# Patient Record
Sex: FEMALE | Race: WHITE | Employment: PART TIME | URBAN - METROPOLITAN AREA
[De-identification: names, ages, dates, MRNs, and addresses within clinical notes are randomized per-mention and may not be internally consistent; named-entity substitution may affect disease eponyms.]

---

## 2021-10-21 ENCOUNTER — HOSPITAL ENCOUNTER (INPATIENT)
Age: 26
LOS: 5 days | Discharge: HOME | DRG: 753 | End: 2021-10-26
Attending: EMERGENCY MEDICINE | Admitting: PSYCHIATRY & NEUROLOGY
Payer: COMMERCIAL

## 2021-10-21 DIAGNOSIS — F23 ACUTE PSYCHOSIS (HCC): ICD-10-CM

## 2021-10-21 DIAGNOSIS — F31.2 SEVERE MANIC BIPOLAR 1 DISORDER WITH PSYCHOTIC BEHAVIOR (HCC): Primary | ICD-10-CM

## 2021-10-21 LAB
ACETAMINOPHEN LEVEL: <5 MCG/ML (ref 10–30)
ALBUMIN SERPL-MCNC: 4.3 G/DL (ref 3.5–5.2)
ALP BLD-CCNC: 76 U/L (ref 35–104)
ALT SERPL-CCNC: 12 U/L (ref 0–32)
AMPHETAMINE SCREEN, URINE: NOT DETECTED
ANION GAP SERPL CALCULATED.3IONS-SCNC: 11 MMOL/L (ref 7–16)
AST SERPL-CCNC: 17 U/L (ref 0–31)
BARBITURATE SCREEN URINE: NOT DETECTED
BASOPHILS ABSOLUTE: 0.05 E9/L (ref 0–0.2)
BASOPHILS RELATIVE PERCENT: 0.4 % (ref 0–2)
BENZODIAZEPINE SCREEN, URINE: NOT DETECTED
BILIRUB SERPL-MCNC: 0.3 MG/DL (ref 0–1.2)
BUN BLDV-MCNC: 6 MG/DL (ref 6–20)
CALCIUM SERPL-MCNC: 9.9 MG/DL (ref 8.6–10.2)
CANNABINOID SCREEN URINE: POSITIVE
CHLORIDE BLD-SCNC: 103 MMOL/L (ref 98–107)
CO2: 25 MMOL/L (ref 22–29)
COCAINE METABOLITE SCREEN URINE: POSITIVE
CREAT SERPL-MCNC: 0.9 MG/DL (ref 0.5–1)
EOSINOPHILS ABSOLUTE: 0.03 E9/L (ref 0.05–0.5)
EOSINOPHILS RELATIVE PERCENT: 0.2 % (ref 0–6)
ETHANOL: <10 MG/DL (ref 0–0.08)
FENTANYL SCREEN, URINE: NOT DETECTED
GFR AFRICAN AMERICAN: >60
GFR NON-AFRICAN AMERICAN: >60 ML/MIN/1.73
GLUCOSE BLD-MCNC: 115 MG/DL (ref 74–99)
HCG, URINE, POC: NEGATIVE
HCT VFR BLD CALC: 41.9 % (ref 34–48)
HEMOGLOBIN: 13.8 G/DL (ref 11.5–15.5)
IMMATURE GRANULOCYTES #: 0.08 E9/L
IMMATURE GRANULOCYTES %: 0.6 % (ref 0–5)
INFLUENZA A: NOT DETECTED
INFLUENZA B: NOT DETECTED
LYMPHOCYTES ABSOLUTE: 1.71 E9/L (ref 1.5–4)
LYMPHOCYTES RELATIVE PERCENT: 12.7 % (ref 20–42)
Lab: ABNORMAL
Lab: NORMAL
MCH RBC QN AUTO: 28.7 PG (ref 26–35)
MCHC RBC AUTO-ENTMCNC: 32.9 % (ref 32–34.5)
MCV RBC AUTO: 87.1 FL (ref 80–99.9)
METHADONE SCREEN, URINE: NOT DETECTED
MONOCYTES ABSOLUTE: 0.92 E9/L (ref 0.1–0.95)
MONOCYTES RELATIVE PERCENT: 6.8 % (ref 2–12)
NEGATIVE QC PASS/FAIL: NORMAL
NEUTROPHILS ABSOLUTE: 10.68 E9/L (ref 1.8–7.3)
NEUTROPHILS RELATIVE PERCENT: 79.3 % (ref 43–80)
OPIATE SCREEN URINE: NOT DETECTED
OXYCODONE URINE: NOT DETECTED
PDW BLD-RTO: 13.4 FL (ref 11.5–15)
PHENCYCLIDINE SCREEN URINE: NOT DETECTED
PLATELET # BLD: 228 E9/L (ref 130–450)
PMV BLD AUTO: 11.4 FL (ref 7–12)
POSITIVE QC PASS/FAIL: NORMAL
POTASSIUM SERPL-SCNC: 4.2 MMOL/L (ref 3.5–5)
RBC # BLD: 4.81 E12/L (ref 3.5–5.5)
SALICYLATE, SERUM: 0.9 MG/DL (ref 0–30)
SARS-COV-2 RNA, RT PCR: NOT DETECTED
SODIUM BLD-SCNC: 139 MMOL/L (ref 132–146)
TOTAL PROTEIN: 7.2 G/DL (ref 6.4–8.3)
TRICYCLIC ANTIDEPRESSANTS SCREEN SERUM: NEGATIVE NG/ML
WBC # BLD: 13.5 E9/L (ref 4.5–11.5)

## 2021-10-21 PROCEDURE — 99285 EMERGENCY DEPT VISIT HI MDM: CPT

## 2021-10-21 PROCEDURE — 82077 ASSAY SPEC XCP UR&BREATH IA: CPT

## 2021-10-21 PROCEDURE — 93005 ELECTROCARDIOGRAM TRACING: CPT | Performed by: EMERGENCY MEDICINE

## 2021-10-21 PROCEDURE — 80053 COMPREHEN METABOLIC PANEL: CPT

## 2021-10-21 PROCEDURE — 85025 COMPLETE CBC W/AUTO DIFF WBC: CPT

## 2021-10-21 PROCEDURE — 80143 DRUG ASSAY ACETAMINOPHEN: CPT

## 2021-10-21 PROCEDURE — 1240000000 HC EMOTIONAL WELLNESS R&B

## 2021-10-21 PROCEDURE — 80307 DRUG TEST PRSMV CHEM ANLYZR: CPT

## 2021-10-21 PROCEDURE — 80179 DRUG ASSAY SALICYLATE: CPT

## 2021-10-21 PROCEDURE — 87636 SARSCOV2 & INF A&B AMP PRB: CPT

## 2021-10-21 RX ORDER — FLUPHENAZINE HYDROCHLORIDE 2.5 MG/ML
5 INJECTION, SOLUTION INTRAMUSCULAR EVERY 6 HOURS PRN
Status: DISCONTINUED | OUTPATIENT
Start: 2021-10-21 | End: 2021-10-26 | Stop reason: HOSPADM

## 2021-10-21 RX ORDER — HALOPERIDOL 5 MG
5 TABLET ORAL EVERY 6 HOURS PRN
Status: DISCONTINUED | OUTPATIENT
Start: 2021-10-21 | End: 2021-10-21

## 2021-10-21 RX ORDER — TRAZODONE HYDROCHLORIDE 50 MG/1
50 TABLET ORAL NIGHTLY PRN
Status: DISCONTINUED | OUTPATIENT
Start: 2021-10-21 | End: 2021-10-26 | Stop reason: HOSPADM

## 2021-10-21 RX ORDER — LORAZEPAM 2 MG/ML
2 INJECTION INTRAMUSCULAR EVERY 6 HOURS PRN
Status: DISCONTINUED | OUTPATIENT
Start: 2021-10-21 | End: 2021-10-26 | Stop reason: HOSPADM

## 2021-10-21 RX ORDER — MAGNESIUM HYDROXIDE/ALUMINUM HYDROXICE/SIMETHICONE 120; 1200; 1200 MG/30ML; MG/30ML; MG/30ML
30 SUSPENSION ORAL PRN
Status: DISCONTINUED | OUTPATIENT
Start: 2021-10-21 | End: 2021-10-26 | Stop reason: HOSPADM

## 2021-10-21 RX ORDER — HYDROXYZINE PAMOATE 50 MG/1
50 CAPSULE ORAL 3 TIMES DAILY PRN
Status: DISCONTINUED | OUTPATIENT
Start: 2021-10-21 | End: 2021-10-26 | Stop reason: HOSPADM

## 2021-10-21 RX ORDER — ACETAMINOPHEN 325 MG/1
650 TABLET ORAL EVERY 6 HOURS PRN
Status: DISCONTINUED | OUTPATIENT
Start: 2021-10-21 | End: 2021-10-26 | Stop reason: HOSPADM

## 2021-10-21 RX ORDER — DIPHENHYDRAMINE HCL 25 MG
50 TABLET ORAL EVERY 6 HOURS PRN
Status: DISCONTINUED | OUTPATIENT
Start: 2021-10-21 | End: 2021-10-26 | Stop reason: HOSPADM

## 2021-10-21 RX ORDER — DIPHENHYDRAMINE HYDROCHLORIDE 50 MG/ML
50 INJECTION INTRAMUSCULAR; INTRAVENOUS EVERY 6 HOURS PRN
Status: DISCONTINUED | OUTPATIENT
Start: 2021-10-21 | End: 2021-10-26 | Stop reason: HOSPADM

## 2021-10-21 RX ORDER — NICOTINE 21 MG/24HR
1 PATCH, TRANSDERMAL 24 HOURS TRANSDERMAL DAILY
Status: DISCONTINUED | OUTPATIENT
Start: 2021-10-21 | End: 2021-10-22

## 2021-10-21 RX ORDER — LORAZEPAM 1 MG/1
2 TABLET ORAL EVERY 6 HOURS PRN
Status: DISCONTINUED | OUTPATIENT
Start: 2021-10-21 | End: 2021-10-26 | Stop reason: HOSPADM

## 2021-10-21 RX ORDER — ZIPRASIDONE MESYLATE 20 MG/ML
20 INJECTION, POWDER, LYOPHILIZED, FOR SOLUTION INTRAMUSCULAR ONCE
Status: DISCONTINUED | OUTPATIENT
Start: 2021-10-21 | End: 2021-10-26 | Stop reason: HOSPADM

## 2021-10-21 RX ORDER — HALOPERIDOL 5 MG/ML
5 INJECTION INTRAMUSCULAR EVERY 6 HOURS PRN
Status: DISCONTINUED | OUTPATIENT
Start: 2021-10-21 | End: 2021-10-21

## 2021-10-21 ASSESSMENT — SLEEP AND FATIGUE QUESTIONNAIRES
DIFFICULTY FALLING ASLEEP: YES
SLEEP PATTERN: INSOMNIA
AVERAGE NUMBER OF SLEEP HOURS: 45
RESTFUL SLEEP: NO
DIFFICULTY ARISING: NO
DO YOU HAVE DIFFICULTY SLEEPING: YES
DO YOU USE A SLEEP AID: COMMENT
DIFFICULTY STAYING ASLEEP: YES

## 2021-10-21 ASSESSMENT — PATIENT HEALTH QUESTIONNAIRE - PHQ9: SUM OF ALL RESPONSES TO PHQ QUESTIONS 1-9: 13

## 2021-10-21 ASSESSMENT — PAIN DESCRIPTION - INTENSITY: RATING_2: 9

## 2021-10-21 ASSESSMENT — PAIN DESCRIPTION - PROGRESSION
CLINICAL_PROGRESSION_2: NOT CHANGED
CLINICAL_PROGRESSION: NOT CHANGED

## 2021-10-21 ASSESSMENT — PAIN DESCRIPTION - LOCATION
LOCATION_2: HEAD
LOCATION: BACK

## 2021-10-21 ASSESSMENT — PAIN DESCRIPTION - PAIN TYPE: TYPE: ACUTE PAIN

## 2021-10-21 ASSESSMENT — PAIN DESCRIPTION - FREQUENCY: FREQUENCY: CONTINUOUS

## 2021-10-21 ASSESSMENT — PAIN DESCRIPTION - DESCRIPTORS
DESCRIPTORS: BURNING
DESCRIPTORS_2: DISCOMFORT

## 2021-10-21 ASSESSMENT — PAIN SCALES - GENERAL
PAINLEVEL_OUTOF10: 10
PAINLEVEL_OUTOF10: 0

## 2021-10-21 ASSESSMENT — LIFESTYLE VARIABLES: HISTORY_ALCOHOL_USE: YES

## 2021-10-21 ASSESSMENT — PAIN DESCRIPTION - DURATION: DURATION_2: CONTINUOUS

## 2021-10-21 ASSESSMENT — PAIN DESCRIPTION - ORIENTATION
ORIENTATION: MID
ORIENTATION_2: LEFT

## 2021-10-21 NOTE — ED PROVIDER NOTES
Department of Emergency Medicine   ED  Provider Note  Admit Date/RoomTime: 10/21/2021  8:57 AM  ED Room: [address] [place] [address]          History of Present Illness:  10/21/21, Time: 9:19 AM EDT  Chief Complaint   Patient presents with    Psychiatric Evaluation     found in impound lot busting out car windows pt was naked pt reported that she is from [address] [place] [address] Road and is schizophrenic pt is not pinkslipped pt denies si/denies hi admits to Hallucinations reports compliant with meds tx with [facility] in [address] is a 27 y.o. female presenting to the ED for bizarre behavior with history of schizophrenia, beginning after being found this morning. Patient is from [facility] with a history of schizophrenia. Patient was found to be in a car lot punching windows, completely naked. Patient was then brought to ED for evaluation. Patient is requesting that we call her  to take her home. She then states that she was driving from [facility] [employer] to [employer] to get her boyfriend out of [employer]. She appears to be a poor historian with flight of ideas and appearing to be acutely psychotic. Patient denies any drug use. Review of Systems:   Pertinent positives and negatives are stated within HPI, all other systems reviewed and are negative.        --------------------------------------------- PAST HISTORY ---------------------------------------------  Past Medical History:  has a past medical history of Schizophrenia ([facility] [address] Past Surgical History:  has no past surgical history on file. Social History:  reports that she has never smoked. She has never used smokeless tobacco. She reports current alcohol use. Family History: family history is not on file. . Unless otherwise noted, family history is non contributory    The patients home medications have been reviewed.     Allergies: Imitrex [sumatriptan]        ---------------------------------------------------PHYSICAL EXAM--------------------------------------    Constitutional/General: Alert and oriented x3  Head: Normocephalic and atraumatic  Eyes: PERRL, EOMI, sclera non icteric  Mouth: Oropharynx clear, handling secretions, no trismus, no asymmetry of the posterior oropharynx or uvular edema  Neck: Supple, full ROM, no stridor, no meningeal signs  Respiratory: Lungs clear to auscultation bilaterally, no wheezes, rales, or rhonchi. Not in respiratory distress  Cardiovascular:  Regular rate. Regular rhythm. No murmurs, no aortic murmurs, no gallops, or rubs. 2+ distal pulses. Equal extremity pulses. Chest: No chest wall tenderness  GI:  Abdomen Soft, Non tender, Non distended. No rebound, guarding, or rigidity. No pulsatile masses. Musculoskeletal: Moves all extremities x 4. Warm and well perfused, no clubbing, cyanosis, or edema. Capillary refill <3 seconds  Integument: skin warm and dry. No rashes. Neurologic: GCS 15, no focal deficits, symmetric strength 5/5 in the upper and lower extremities bilaterally  Psychiatric: Flight of ideas with tangential thought          -------------------------------------------------- RESULTS -------------------------------------------------  I have personally reviewed all laboratory and imaging results for this patient. Results are listed below.      LABS: (Lab results interpreted by me)  Results for orders placed or performed during the hospital encounter of 10/21/21   COVID-19 & Influenza Combo    Specimen: Nasopharyngeal Swab   Result Value Ref Range    SARS-CoV-2 RNA, RT PCR NOT DETECTED NOT DETECTED    INFLUENZA A NOT DETECTED NOT DETECTED    INFLUENZA B NOT DETECTED NOT DETECTED   Comprehensive Metabolic Panel   Result Value Ref Range    Sodium 139 132 - 146 mmol/L    Potassium 4.2 3.5 - 5.0 mmol/L    Chloride 103 98 - 107 mmol/L    CO2 25 22 - 29 mmol/L    Anion Gap 11 7 - 16 mmol/L    Glucose 115 (H) 74 - 99 mg/dL    BUN 6 6 - 20 mg/dL    CREATININE 0.9 0.5 - 1.0 mg/dL    GFR Non-African American >60 >=60 mL/min/1.73    GFR African American >60     Calcium 9.9 8.6 - 10.2 mg/dL    Total Protein 7.2 6.4 - 8.3 g/dL    Albumin 4.3 3.5 - 5.2 g/dL    Total Bilirubin 0.3 0.0 - 1.2 mg/dL    Alkaline Phosphatase 76 35 - 104 U/L    ALT 12 0 - 32 U/L    AST 17 0 - 31 U/L   CBC Auto Differential   Result Value Ref Range    WBC 13.5 (H) 4.5 - 11.5 E9/L    RBC 4.81 3.50 - 5.50 E12/L    Hemoglobin 13.8 11.5 - 15.5 g/dL    Hematocrit 41.9 34.0 - 48.0 %    MCV 87.1 80.0 - 99.9 fL    MCH 28.7 26.0 - 35.0 pg    MCHC 32.9 32.0 - 34.5 %    RDW 13.4 11.5 - 15.0 fL    Platelets 383 493 - 039 E9/L    MPV 11.4 7.0 - 12.0 fL    Neutrophils % 79.3 43.0 - 80.0 %    Immature Granulocytes % 0.6 0.0 - 5.0 %    Lymphocytes % 12.7 (L) 20.0 - 42.0 %    Monocytes % 6.8 2.0 - 12.0 %    Eosinophils % 0.2 0.0 - 6.0 %    Basophils % 0.4 0.0 - 2.0 %    Neutrophils Absolute 10.68 (H) 1.80 - 7.30 E9/L    Immature Granulocytes # 0.08 E9/L    Lymphocytes Absolute 1.71 1.50 - 4.00 E9/L    Monocytes Absolute 0.92 0.10 - 0.95 E9/L    Eosinophils Absolute 0.03 (L) 0.05 - 0.50 E9/L    Basophils Absolute 0.05 0.00 - 0.20 E9/L   Serum Drug Screen   Result Value Ref Range    Ethanol Lvl <10 mg/dL    Acetaminophen Level <5.0 (L) 10.0 - 47.2 mcg/mL    Salicylate, Serum 0.9 0.0 - 30.0 mg/dL    TCA Scrn NEGATIVE Cutoff:300 ng/mL   Urine Drug Screen   Result Value Ref Range    Amphetamine Screen, Urine NOT DETECTED Negative <1000 ng/mL    Barbiturate Screen, Ur NOT DETECTED Negative < 200 ng/mL    Benzodiazepine Screen, Urine NOT DETECTED Negative < 200 ng/mL    Cannabinoid Scrn, Ur POSITIVE (A) Negative < 50ng/mL    Cocaine Metabolite Screen, Urine POSITIVE (A) Negative < 300 ng/mL    Opiate Scrn, Ur NOT DETECTED Negative < 300ng/mL    PCP Screen, Urine NOT DETECTED Negative < 25 ng/mL    Methadone Screen, Urine NOT DETECTED Negative <300 ng/mL    Oxycodone Urine NOT DETECTED Negative <100 ng/mL    FENTANYL SCREEN, URINE NOT DETECTED Negative <1 ng/mL    Drug Screen Comment: see below    POC Pregnancy Urine Qual   Result Value Ref Range    HCG, Urine, POC Negative Negative    Lot Number [id number]     Positive QC Pass/Fail Pass     Negative QC Pass/Fail Pass    EKG 12 Lead   Result Value Ref Range    Ventricular Rate 64 BPM    Atrial Rate 64 BPM    P-R Interval 138 ms    QRS Duration 74 ms    Q-T Interval 394 ms    QTc Calculation (Bazett) 406 ms    P Axis 46 degrees    R Axis 24 degrees    T Axis 26 degrees   ,       RADIOLOGY:  Interpreted by Radiologist unless otherwise specified  No orders to display         EKG Interpretation  Interpreted by emergency department physician, Dr. Janie Rowley    Date of EKG: 10/21/21  Time: 1147    Rhythm: normal sinus   Rate: 64  Axis: normal  Conduction: normal  ST Segments: no acute change  T Waves: no acute change    Clinical Impression: No findings suggestive of acute ischemia or injury  Comparison to prior EKG: no previous EKG      ------------------------- NURSING NOTES AND VITALS REVIEWED ---------------------------   The nursing notes within the ED encounter and vital signs as below have been reviewed by myself  BP (!) 130/93   Pulse 106   Temp 98.8 °F (37.1 °C) (Oral)   Resp 16   Ht 5' 8\" (1.727 m)   Wt 228 lb (103.4 kg)   LMP  (LMP Unknown)   SpO2 98%   BMI 34.67 kg/m²     Oxygen Saturation Interpretation: Normal    The patients available past medical records and past encounters were reviewed. ------------------------------ ED COURSE/MEDICAL DECISION MAKING----------------------  Medications   ziprasidone (GEODON) injection 20 mg (0 mg IntraMUSCular Held 10/21/21 1211)   sterile water injection 1.2 mL (0 mLs Injection Held 10/21/21 1213)                 Medical Decision Making:     I, Dr. Alec Ngo, am the primary provider of record    23-year-old female with history of schizophrenia presenting for bizarre behavior.   Patient was found at a car lot attempting to break windows while completely naked.  She arrives somewhat agitated with tangential thoughts and flight of ideas. Patient became more agitated, was given Geodon. Urine drug screen is positive for cannabinoids and cocaine, serum drug screen is negative. Metabolic panel is within acceptable limits, no leukocytosis or anemia. EKG shows no signs of ischemia or injury. She is medically clear for mental health evaluation. Cal[place] slip was initiated. Re-Evaluations: This patient's ED course included: a personal history and physicial examination    This patient has remained hemodynamically stable during their ED course. Counseling: The emergency provider has spoken with the patient and discussed todays results, in addition to providing specific details for the plan of care and counseling regarding the diagnosis and prognosis. Questions are answered at this time and they are agreeable with the plan.       --------------------------------- IMPRESSION AND DISPOSITION ---------------------------------    IMPRESSION  1. Acute psychosis ([facility] [address]        DISPOSITION  Disposition: Admit to mental health unit - medically cleared for admission  Patient condition is stable        NOTE: This report was transcribed using voice recognition software.  Every effort was made to ensure accuracy; however, inadvertent computerized transcription errors may be present        Oscar Jones DO  10/21/21 1508

## 2021-10-21 NOTE — ED NOTES
Attempted to call Dr Sherwood/ Alexandra Borja NP for admission informed this RN will call back d/t currently with another patient.      Britta Sharma RN  10/21/21 9018

## 2021-10-21 NOTE — ED NOTES
Registration requested insurance information. Pt's wallet in belongings has several items not in her name. [employer] was contacted.      Linda Crespo  10/21/21 0107

## 2021-10-21 NOTE — ED NOTES
Pt not cooperative needs constant redirection refusing lab work requesting to leave AMA. This RN went and spoke with dr Gaby Conley him of pt's concerns. awaiting evaluation.      Nereyda Gibbs RN  10/21/21 0289

## 2021-10-21 NOTE — ED NOTES
Pt reported to this RN that she takes Abilify, Trazodone, Latuda, Hydroxyzine and CBD oil.      Holley Pallas, RN  10/21/21 6425

## 2021-10-21 NOTE — ED NOTES
Emergency Department [facility] AN AFFILIATE OF [facility] Biopsychosocial Assessment Note    Chief Complaint: found in [address] out car windows pt was naked pt reported that she is from [address] and is schizophrenic pt is not pinkslipped pt denies si/denies hi admits to Hallucinations reports compliant with meds tx with [facility] in [address] [facility] Summary/History:   Pt was brought in by EMS, who reported that she was found naked smashing windows in the impound. Pt is evidently unstable - talking to herself and unseen others, saying \"the voices in my head are not real\", yelling at staff, uncooperative, and disruptive. She explained that she came to [facility] to see her boyfriend in senior care, but needs her  from [facility] [employer] to come and pick her up. Her thoughts are scattered and confused, her speech is slurred, her behavior is hyperactive - she is a poor historian at this time. Pt denies SI, no HI - she appears to be internally stimulated, overly anxious with poor insight and judgement and manic. Pt stated \"i'm crazy as hell\". Gender  [] Male [x] Female [] Transgender  [] Other    Sexual Orientation    [x] Heterosexual [] Homosexual [] Bisexual [] Other    Suicidal Behavioral: CSSR-S Complete. [] Reports:    [] Past [] Present   [x] Denies    Homicidal/ Violent Behavior  [] Reports:   [] Past [] Present   [x] Denies     Hallucinations/Delusions   [x] Reports:   [] Denies     Substance Use/Alcohol Use/Addiction: SBIRT Screen Complete.    [] Reports:   [x] Denies     Trauma History  [] Reports:  [] Denies     Collateral Information:   No emergency contacts on file and pt is not willing to give any information    Level of Care/Disposition Plan  [] Home:   [] Outpatient Provider:   [] Crisis Unit:   [x] Inpatient Psychiatric Unit:  [] Other:        Navi Hendricks, [facility]  10/21/21 [address] [facility] Bard Walker, [facility]  10/21/21 1004

## 2021-10-21 NOTE — ED NOTES
Dr Griselda Colder on site pt cont to be uncooperative/ cont to voice from [facility] [employer] states has a boyfriend here in residential that she was going to  denies smashing any windows then in another instant states she has schizophrenia and doesn't remeber what she has done.   Pt informed of plan of care and continues to refuse blood work or further testing     Nereyda Gibbs RN  10/21/21 8146

## 2021-10-21 NOTE — ED NOTES
Bed: BH-26  Expected date: 10/21/21  Expected time:   Means of arrival: [facility]  Comments:  [place]  10/21/21 7221

## 2021-10-21 NOTE — ED NOTES
Patient has been accepted for admission to [facility] by Oscar Hung NP under Dr. Feliberto Orellana. Patient will go to room 7521B. Called admitting and notified Ela Mast. CAMRYN RN is aware to call nurse to nurse and put in for patient transport.      LAKESHA Prieto, [place]  10/21/21 1923

## 2021-10-22 PROBLEM — F31.2 SEVERE MANIC BIPOLAR 1 DISORDER WITH PSYCHOTIC BEHAVIOR (HCC): Status: ACTIVE | Noted: 2021-10-22

## 2021-10-22 PROBLEM — F19.10 POLYSUBSTANCE ABUSE (HCC): Status: ACTIVE | Noted: 2021-10-22

## 2021-10-22 LAB
CHOLESTEROL, TOTAL: 181 MG/DL (ref 0–199)
EKG ATRIAL RATE: 64 BPM
EKG P AXIS: 46 DEGREES
EKG P-R INTERVAL: 138 MS
EKG Q-T INTERVAL: 394 MS
EKG QRS DURATION: 74 MS
EKG QTC CALCULATION (BAZETT): 406 MS
EKG R AXIS: 24 DEGREES
EKG T AXIS: 26 DEGREES
EKG VENTRICULAR RATE: 64 BPM
HBA1C MFR BLD: 5.4 % (ref 4–5.6)
HDLC SERPL-MCNC: 37 MG/DL
LDL CHOLESTEROL CALCULATED: 116 MG/DL (ref 0–99)
TOTAL CK: 137 U/L (ref 20–180)
TRIGL SERPL-MCNC: 141 MG/DL (ref 0–149)
VLDLC SERPL CALC-MCNC: 28 MG/DL

## 2021-10-22 PROCEDURE — 6370000000 HC RX 637 (ALT 250 FOR IP): Performed by: PSYCHIATRY & NEUROLOGY

## 2021-10-22 PROCEDURE — 83036 HEMOGLOBIN GLYCOSYLATED A1C: CPT

## 2021-10-22 PROCEDURE — 80061 LIPID PANEL: CPT

## 2021-10-22 PROCEDURE — 82550 ASSAY OF CK (CPK): CPT

## 2021-10-22 PROCEDURE — 99222 1ST HOSP IP/OBS MODERATE 55: CPT | Performed by: NURSE PRACTITIONER

## 2021-10-22 PROCEDURE — 6370000000 HC RX 637 (ALT 250 FOR IP): Performed by: NURSE PRACTITIONER

## 2021-10-22 PROCEDURE — 93010 ELECTROCARDIOGRAM REPORT: CPT | Performed by: INTERNAL MEDICINE

## 2021-10-22 PROCEDURE — 36415 COLL VENOUS BLD VENIPUNCTURE: CPT

## 2021-10-22 PROCEDURE — 1240000000 HC EMOTIONAL WELLNESS R&B

## 2021-10-22 RX ORDER — OLANZAPINE 10 MG/1
10 TABLET ORAL NIGHTLY
Status: DISCONTINUED | OUTPATIENT
Start: 2021-10-22 | End: 2021-10-23

## 2021-10-22 RX ORDER — DIVALPROEX SODIUM 500 MG/1
500 TABLET, DELAYED RELEASE ORAL EVERY 12 HOURS SCHEDULED
Status: DISCONTINUED | OUTPATIENT
Start: 2021-10-22 | End: 2021-10-26 | Stop reason: HOSPADM

## 2021-10-22 RX ADMIN — NICOTINE POLACRILEX 4 MG: 2 GUM, CHEWING BUCCAL at 19:40

## 2021-10-22 RX ADMIN — DIVALPROEX SODIUM 500 MG: 250 TABLET, DELAYED RELEASE ORAL at 21:10

## 2021-10-22 RX ADMIN — TRAZODONE HYDROCHLORIDE 50 MG: 50 TABLET ORAL at 21:10

## 2021-10-22 ASSESSMENT — PAIN - FUNCTIONAL ASSESSMENT
PAIN_FUNCTIONAL_ASSESSMENT: 0-10
PAIN_FUNCTIONAL_ASSESSMENT: 0-10

## 2021-10-22 ASSESSMENT — SLEEP AND FATIGUE QUESTIONNAIRES
DIFFICULTY ARISING: NO
DIFFICULTY FALLING ASLEEP: YES
DIFFICULTY STAYING ASLEEP: YES
RESTFUL SLEEP: YES
DO YOU HAVE DIFFICULTY SLEEPING: YES
SLEEP PATTERN: DIFFICULTY FALLING ASLEEP;DISTURBED/INTERRUPTED SLEEP
DO YOU USE A SLEEP AID: YES

## 2021-10-22 ASSESSMENT — LIFESTYLE VARIABLES: HISTORY_ALCOHOL_USE: YES

## 2021-10-22 ASSESSMENT — PAIN SCALES - GENERAL
PAINLEVEL_OUTOF10: 0
PAINLEVEL_OUTOF10: 0

## 2021-10-22 NOTE — BH NOTE
[facility]  Initial Interdisciplinary Treatment Plan NOTE    Review Date & Time: 10-22-21  930 am    Patient was not in treatment team    Admission Type:   Admission Type: Involuntary    Reason for admission:  Reason for Admission: \"I was destroying shit like crazy, becausei think i am God \" \"was going to  friend from residential and got lost on the side of the road\" lost my grandma 2 months ago and she is the reason I keep loosing my mind \"      Estimated Length of Stay Update:  2-4 days  Estimated Discharge Date Update: 2-4 days    EDUCATION:   Learner Progress Toward Treatment Goals: Reviewed results and recommendations of this team and Reviewed group plan and strategies    Method: Small group    Outcome: Verbalized understanding    PATIENT GOALS:  \"To go to group\" pr pt. PLAN/TREATMENT RECOMMENDATIONS UPDATE: Begin medication regimen and assess pt responses. GOALS UPDATE:   Time frame for Short-Term Goals: Daily re assessment .     Kurt Colon RN

## 2021-10-22 NOTE — PROGRESS NOTES
(x )  Recognizing danger situations (included triggers and roadblocks)                    (x )  Coping skills (new ways to manage stress, exercise, relaxation techniques, changing routine, distraction)                                                           (x )  Basic information about quitting (benefits of quitting, techniques in how to quit, available resources  ( x) Referral for counseling faxed to [facility]                                           ( ) Patient refused counseling  ( ) Patient has not smoked in the last 30 days    Metabolic Screening:    No results found for: LABA1C    No results found for: CHOL  No results found for: TRIG  No results found for: HDL  No components found for: LDLCAL  No results found for: LABVLDL      Body mass index is 34.67 kg/m². BP Readings from Last 2 Encounters:   10/21/21 124/72           Pt admitted with followings belongings:  Dentures: None  Vision - Corrective Lenses: None  Hearing Aid: None  Jewelry: None  Body Piercings Removed: N/A  Clothing: Shirt  Other Valuables: Other (Comment) (book bag)     Patient's home medications were not brought,  Patient oriented to surroundings and program expectations and copy of patient rights given.  Received admission packet:and PIN   Consents reviewed, signed except voluntary  Patient verbalize understanding: of involuntary form and rights    Patient education on precautions: every 15 min observations for safety                    Dennis Rico RN

## 2021-10-22 NOTE — BH NOTE
Pt is stable and without distress presently. Py can become anxious though not out of control. Pt denies suicidal or homicidal ideations. Pt denies hallucinations. Will follow and monitor.

## 2021-10-22 NOTE — CARE COORDINATION
Biopsychosocial Assessment Note    Social work met with patient to complete the biopsychosocial assessment and CSSR-S. Mental Status Exam: pt alert&oriented x4. Pt cooperative. Mood anxious, labile, congruent affect. Eye contact fair, speech rapid. Pt thoughts preoccupied, tangential. Insight/judgement poor. Pt denies SI/HI/AVH. Chief Complaint: \"found in [address] out car windows pt was naked pt reported that she is from [address] and is schizophrenic pt is not pinkslipped pt denies si/denies hi admits to Hallucinations reports compliant with meds tx with [facility] in [place]\"    Patient Report: pt reports she came to [facility] to  her friend from [facility], she was told he wasn't being released because he had to go to a half way house, and then became mad, went to the car lot and began breaking windows naked. Pt states she has a hx of getting naked when she does drugs, reports using cocaine \"recreationally\" and CBD/THC. Pt reports past alcohol abuse, reports she quit one month ago. Pt has a psych admission hx, last admission at [facility] 10/12/21 for three days. Pt reports she was admitted at this time for being naked again. Pt denies any hx of SI, attempts, or self injurious behaviors. Pt denies trauma hx.      Gender  [] Male [x] Female [] Transgender  [] Other    Sexual Orientation    [x] Heterosexual [] Homosexual [] Bisexual [] Other    Suicidal Ideation  [] Past [] Present [x] Denies     Homicidal Ideation  [] Past [] Present [x] Denies     Hallucinations/Delusions (Specify type)  [] Reports [x] Denies     Substance Use/Alcohol Use/Addiction  [x] Reports [] Denies     Tobacco Use (within the last 6 months)  [x] Reports [] Denies     Trauma History  [] Reports [x] Denies     Collateral Contact (JANEEN signed)  Name: Velvet Razo  Relationship:   Number: [phone]    Collateral Information: Spoke with Lesli Zazueta who confirmed pt will be returning home, he will be coming here to get her, and there are no weapons in the home. He stated [place] car [place]. Angi Edmundo reports pt drove here to get someone from snf and then everything happened that led to her admission. He confirmed pt was just in the hospital last week for the same thing however he feels pt is getting worse. He is concerned with pt getting stable on the right meds and with pt getting into a position that she cannot get out of or get herself hurt.           Access to Weapons per Collateral Contact: [] Reports [x] Denies       Follow up provider preference: [address] [facility] for discharge  Location (where do they plan on discharging to?): home to  in [address] (who will pick them up at discharge?) TBD, has caresource    Medications (will they have money for copays at discharge?): [employer]

## 2021-10-22 NOTE — PLAN OF CARE
Pt denies suicidal or homicidal ideations. Pt denies hallucinations. Pt is without immediate distress presently. Pt  reports goal, \"to go to group\" pr pt. Will follow and monitor.

## 2021-10-22 NOTE — H&P
Department of Psychiatry  History and Physical - Adult       Patient personally seen and examined by me mental status examination performed. I agree the below assessment by the medical student. Psychomotor evaluation revealed no agitation retardation any abnormal movements. Her eye contact is fair her speech is rapid and pressured. Her mood is \"I am fine. \"  Affect is anxious. Thought process with flight of ideas. Thought contents devoid of auditory visualizations delusions or other perceptual normalities. She denies suicidal homicidal ideations intent or plan her impulse control is poor her cognitive function was to be at her baseline her insight judgment is poor she is alert oriented time place and person          CHIEF COMPLAINT:  Wandering naked on the impound lot and punching car windows    Patient was seen after discussing with the treatment team and reviewing the chart    CIRCUMSTANCES OF ADMISSION: Involuntarily admitted from ED after exhibiting bizarre behavior and requiring psychiatric evaluation. HISTORY OF PRESENT ILLNESS:      The patient is a 27 y.o.,  female, currently unemployed and with significant past psychiatric history of schizophrenia, anxiety, bipolar disorder , PTSD and history of prescription and street drug abuse presenting to the ED yesterday for bizarre behavior. Patient was last hospitalized at [facility] for acute psychosis. Per ED she was brought in after found naked in an impound lot punching at the windows. Urine toxicology was positive for cannabis and cocaine. EKG was unremarkable. In the ED she admitted to hallucinations and appeared acutely psychotic with flights of ideas and tangentiality to the ED physician. Geodon was administered after she became more agitated. Patient was transferred to 7S after being medically cleared and pink-slipped by ED physician. I saw the patient today and she appears sleepy and irritable.  She was uncooperative and a poor historian, likely because she wanted to sleep. Upon examination, she does not appear to be internally stimulated or acutely psychotic currently. Per the patient, she came from [facility] to  a friend from [place], but was turned away and got lost causing her to become distressed. She sates that she doesn't remember what happened after but remembers someone in the impound lot called 911 and she was brought in by ambulance. She denied cocaine use but admitted to it after being told her urine toxicology was positive. She became angered that the question was asked when it was already known. Her previous hospitalization was a similar presentation in which she was brought in by EMS after being found naked and wandering around the neighborhood, with a positive urine cocaine and cannanbis screen. Collateral contact during that encounter stated she had been \"manic\" recently and patient stated she had had a panic attack triggered by anxiety. Today, she denies any suicidal or homicidal ideations, and denies any auditory or visual hallucinations. She admits to a history of occassional auditory hallucinations, consistent with her schizophrenic history but denies any history of suicidal ideations or attempts. She denies any paranoid ideations and any delusions. Her history does not She endorses difficulty sleeping occasionally but denies depressive symptoms. She describes herself as being very impulsive in general. She denies manic symptoms. She denies feelings of abandonment but endorses feeling empty sometimes. She states that she has been tried on multiple medications in the past including Abilify, Trazodone, Latuda, Hydroxyzine but states that none of them have worked for her in the past, and hence she has discontinued her medications in the last month. She endorses allergies to Abilify and Imitrex and anaphylactic reaction to Haloperidol. She states that trazodone causes her to act bizarrely.  She reports using CBD oil which she claims helps with her symptoms. Patient's believes her mother has either bipolar disorder or schizophrenia. She denies any current or previous legal issues. She denies any history of physical, sexual or emotional abuse and denies any previous head trauma or loss of consciousness. Past Psychiatric History:  Patient admits to previous diagnostic history of anxiety, schizophrenia, PTSD, and bipolar disorder. She reports 3 previous inpatient hospitalizations, 2 in the past year, most recently 10/12/2021 at [facility]. Per patient during her last encounter, she reported being hospitalized in 2015 for a month at [facility], released for a week and re-hospitalized for another month. She has been tried on multiple medications including Zyprexa, Abilify (which she is allergic to), Latuda, and Hydroxyzine. She says none have worked for her except Ativan. She reports anaphylactic reaction to Haldol. Family Psychiatric History:  Patient but thinks mother was diagnosed with either schizophrenia or bipolar disorder in mother. No family history of suicidal ideations or attempt. Substance Abuse History:  Patient admits to cocaine and marijuana use . She denies EtOH use although admitted to it in ED and in previous encounters. She denies history of cigarette smoking and denies any other drug use. Past Legal History:  Patient denies any previous or current legal issues. She has never been jailed or imprisoned. Personal, Family and social History:  Patient was born and raised in [place], She is  with no children and unemployed. She lives with  and 2 friends. Both parents are alive and patient has 4 siblings. She says her family is somewhat supportive. She says she is not looking for a job currently. She completed high school and a semester of college. She wants to go back to college but is not financially able to.     She denies any history of physical, sexual or emotional abuse in childhood. She denies ever being in a motor vehicle accident, or ever suffering head trauma or loss of consciousness. She denies presence of guns or other weapons in the home. Past Medical History:        Diagnosis Date    Migraine     Schizophrenia ([facility] [address]        Medications Prior to Admission:   No medications prior to admission. Past Surgical History:    History reviewed. No pertinent surgical history. Allergies:   Imitrex [sumatriptan], Abilify [aripiprazole], and Haldol [haloperidol]  Anaphylactic reaction to haldol. Family History  History reviewed. No pertinent family history. EXAMINATION:    REVIEW OF SYSTEMS:    ROS:  [x] All negative/unchanged except if checked. Explain positive(checked items) below:  [] Constitutional  [] Eyes  [] Ear/Nose/Mouth/Throat  [] Respiratory  [] CV  [] GI  []   [] Musculoskeletal  [] Skin/Breast  [] Neurological  [] Endocrine  [] Heme/Lymph  [] Allergic/Immunologic    Explanation:     Vitals:  /78   Pulse 83   Temp 97.5 °F (36.4 °C) (Temporal)   Resp 15   Ht 5' 8\" (1.727 m)   Wt 228 lb (103.4 kg)   LMP  (LMP Unknown)   SpO2 95%   BMI 34.67 kg/m²      Physical Examination:   Head: x  Atraumatic: x normocephalic  Skin and Mucosa        Moist x  Dry   Pale  x Normal        Cranial Nerves Examination:   CN II:   xPupils are reactive to light  Pupils are non reactive to light  CN III, IV, VI:  xNo eye deviation    No diplopia or ptosis   CN V:    xFacial Sensation is intact     Facial Sensation is not intact   CN IIIV:   x Hearing is normal to rubbing fingers   CN IX, X:     xNormal gag reflex and phonation   CN XI:   xShoulder shrug and neck rotation is normal  CNXII:    xTongue is midline no deviation or atrophy    Mental Status Examination:    Mental status exam revealed a 27year old female, appears stated age, in hospital gown. She appears fairly groomed and in fair hygiene. Patient was uncooperative and evasive in revealing information. Psychomotor revealed no agitation. Speech was normal rate and rhythm. Eye contact was very poor as patient kept falling asleep. Mood \"I am happy\", affect was incongruent with mood as she was irritable likely due to sleepiness. Thought process is logical, with no flights of ideas or looseness of associations. Thought content today is devoid of any auditory or visual hallucinations. She did not appear internally stimulated today and did not appear to have any paranoid ideations or any other preoccupations. She denies any suicidal or homicidal ideations. Cognitive function is at baseline, she is able to do serial 7s. Recall is 3/3, memory appears intact. She is groggy but oriented to time, place and person. Insight and judgement are poor. Impulse control is poor. DIAGNOSIS:   Severe Bipolar 1 disorder with psychotic features  Polysubstance abuse          LABS: REVIEWED TODAY:  Recent Labs     10/21/21  1114   WBC 13.5*   HGB 13.8        Recent Labs     10/21/21  1114      K 4.2      CO2 25   BUN 6   CREATININE 0.9   GLUCOSE 115*     Recent Labs     10/21/21  1114   BILITOT 0.3   ALKPHOS 76   AST 17   ALT 12     Lab Results   Component Value Date    LABAMPH NOT DETECTED 10/21/2021    BARBSCNU NOT DETECTED 10/21/2021    LABBENZ NOT DETECTED 10/21/2021    LABMETH NOT DETECTED 10/21/2021    OPIATESCREENURINE NOT DETECTED 10/21/2021    PHENCYCLIDINESCREENURINE NOT DETECTED 10/21/2021    ETOH <10 10/21/2021     No results found for: TSH, FREET4  No results found for: LITHIUM  No results found for: VALPROATE, CBMZ  No results found for: LITHIUM, VALPROATE      Radiology No results found. TREATMENT PLAN:    Risk Management: Based on the diagnosis and assessment biopsychosocial treatment model was presented to the patient and was given the opportunity to ask any question.   The patient was agreeable to the plan and all the patient's questions were answered to the patient's satisfaction. I discussed with the patient the risk, benefit, alternative and common side effects for the proposed medication treatment. The patient is consenting to this treatment. Collateral Information:  Will obtain collateral information from the family or friends. Will obtain medical records as appropriate from out patient providers  Will consult the hospitalist for a physical exam to rule out any co-morbid physical condition. Home medication Reconciled     Patient's diagnosis, treatment plan, medication management was formulated at the end of evaluation and after reviewing relevant documentation. Patient was seen directly by myself and Dr. Lizy Valencia Medications started during this admission :    Depakote 500 mg oral BID  Zyprexa 10 mg oral at bedtime  Nicotine patch once daily. Prn Haldol 5mg and Vistaril 50mg q6hr for extreme agitation. Trazodone as ordered for insomnia  Vistaril as ordered for anxiety      Psychotherapy:   Encourage participation in milieu and group therapy  Individual therapy as needed              Behavioral Services  Medicare Certification Upon Admission    I certify that this patient's inpatient psychiatric hospital admission is medically necessary for:    [x] (1) Treatment which could reasonably be expected to improve this patient's condition,       [] (2) Or for diagnostic study;     AND     [x](2) The inpatient psychiatric services are provided while the individual is under the care of a physician and are included in the individualized plan of care.     Estimated length of stay/service 3 to 7 days based on stability    Plan for post-hospital care outpatient psychiatric and counseling services    Electronically signed by CELSO Rodriguez CNP on 07/23/5732 at 12:40 PM      Electronically signed by Rocky Gaspar on 10/22/2021 at 8:35 AM

## 2021-10-23 PROCEDURE — 99231 SBSQ HOSP IP/OBS SF/LOW 25: CPT | Performed by: NURSE PRACTITIONER

## 2021-10-23 PROCEDURE — 6370000000 HC RX 637 (ALT 250 FOR IP): Performed by: NURSE PRACTITIONER

## 2021-10-23 PROCEDURE — 6370000000 HC RX 637 (ALT 250 FOR IP): Performed by: PSYCHIATRY & NEUROLOGY

## 2021-10-23 PROCEDURE — 1240000000 HC EMOTIONAL WELLNESS R&B

## 2021-10-23 RX ORDER — RISPERIDONE 0.5 MG/1
1 TABLET, ORALLY DISINTEGRATING ORAL 2 TIMES DAILY
Status: DISCONTINUED | OUTPATIENT
Start: 2021-10-23 | End: 2021-10-26 | Stop reason: HOSPADM

## 2021-10-23 RX ADMIN — HYDROXYZINE PAMOATE 50 MG: 50 CAPSULE ORAL at 08:56

## 2021-10-23 RX ADMIN — RISPERIDONE 1 MG: 0.5 TABLET, ORALLY DISINTEGRATING ORAL at 20:39

## 2021-10-23 RX ADMIN — DIVALPROEX SODIUM 500 MG: 250 TABLET, DELAYED RELEASE ORAL at 08:56

## 2021-10-23 RX ADMIN — DIVALPROEX SODIUM 500 MG: 250 TABLET, DELAYED RELEASE ORAL at 20:39

## 2021-10-23 RX ADMIN — NICOTINE POLACRILEX 4 MG: 2 GUM, CHEWING BUCCAL at 20:39

## 2021-10-23 RX ADMIN — ACETAMINOPHEN 650 MG: 325 TABLET ORAL at 16:46

## 2021-10-23 RX ADMIN — TRAZODONE HYDROCHLORIDE 50 MG: 50 TABLET ORAL at 20:39

## 2021-10-23 RX ADMIN — HYDROXYZINE PAMOATE 50 MG: 50 CAPSULE ORAL at 18:01

## 2021-10-23 RX ADMIN — NICOTINE POLACRILEX 4 MG: 2 GUM, CHEWING BUCCAL at 11:30

## 2021-10-23 RX ADMIN — NICOTINE POLACRILEX 4 MG: 2 GUM, CHEWING BUCCAL at 16:44

## 2021-10-23 RX ADMIN — NICOTINE POLACRILEX 4 MG: 2 GUM, CHEWING BUCCAL at 08:58

## 2021-10-23 ASSESSMENT — PAIN SCALES - GENERAL
PAINLEVEL_OUTOF10: 0
PAINLEVEL_OUTOF10: 5
PAINLEVEL_OUTOF10: 0

## 2021-10-23 NOTE — CARE COORDINATION
Pt requested to talk with Sw about her discharge plan. Pt stated her  has seizures, she was unable to get a hold of him last night, and she is concerned that he is not okay and wants to be discharged. Sw provided emotional support and education on her pink slip and need for treatment. Sw contacted pt  Marita Jewell. Marita Jewell reports he is okay, he just went to bed early last night. He told sw to tell pt to not worry about him as he has multiple people looking out for him while she gets help, stated he talked with pt prior to Sw calling him and reiterated the same information to pt. Sw advised pt of this information as well.

## 2021-10-23 NOTE — PROGRESS NOTES
Group Therapy Note    Date: 10/23/2021  Start Time: 10:00  End Time: 10:45    Number of Participants: 14    Type of Group: Psychoeducation    Wellness Binder Information  Module Name:  Self-care      Patient's Goal:  To understand and id positive ways to take care of one self in wellness recovery. Notes: Attended group and was able to participate but was intrusive and monopolizing  group. Status After Intervention:  Unchanged    Participation Level:  Active Listener, Interactive and Monopolizing    Participation Quality: Attentive, Sharing and Intrusive      Speech:  normal      Thought Process/Content: Linear      Affective Functioning: Flat      Mood: depressed      Level of consciousness:  Alert      Response to Learning: Progressing to goal      Endings: None Reported    Modes of Intervention: Education      Discipline Responsible: Psychoeducational Specialist      Signature:  DARRYN Stephenson

## 2021-10-23 NOTE — PROGRESS NOTES
BEHAVIORAL HEALTH FOLLOW-UP NOTE     10/23/2021     Patient was seen and examined in person, Chart reviewed   Patient's case discussed with staff/team    Chief Complaint: \"I am allergic to Zyprexa, what can I have in place of it? \"    Interim History:     Patient observed in her room and out on the unit, she is extremely manic. Staff report she refused her Zyprexa when asked about this she states she is allergic to it. However is requesting something in it's place. She is agreeable to Risperdal. She is pleasant, exaggerated, manic and has some delusional thought processes. She is discharge focused, but states she knows she cannot go home today. She has very poor insight into need for treatment. She is minimizing the circumstances surrounding her hospitalization. She has FOI and is difficult to converse with. She is requesting her stuffed animal and voices understanding of why she cannot have it on the unit. Appetite:  [x] Normal/Unchanged  [] Increased  [] Decreased      Sleep:       [] Normal/Unchanged  [] Fair       [x] Poor              Energy:    [] Normal/Unchanged  [x] Increased  [] Decreased        SI [] Present  [x] Absent    HI  []Present  [x] Absent     Aggression:  [] yes  [x] no    Patient is [x] able  [] unable to CONTRACT FOR SAFETY     PAST MEDICAL/PSYCHIATRIC HISTORY:   Past Medical History:   Diagnosis Date    Migraine     Schizophrenia ([facility] [address]        FAMILY/SOCIAL HISTORY:  History reviewed. No pertinent family history.   Social History     Socioeconomic History    Marital status:      Spouse name: Praveena Julio Number of children: 0    Years of education: 15    Highest education level: Not on file   Occupational History    Not on file   Tobacco Use    Smoking status: Current Every Day Smoker     Packs/day: 0.50    Smokeless tobacco: Never Used   Vaping Use    Vaping Use: Never used   Substance and Sexual Activity    Alcohol use: Not Currently     Comment: QUIT MONTHS AGO , 1/5 DAILY \"    Drug use: Yes     Types: Marijuana     Comment: reports cbd, \"QUIT COCAINE DAYS AGO\"     Sexual activity: Yes   Other Topics Concern    Not on file   Social History Narrative    Not on file     Social Determinants of Health     Financial Resource Strain:     Difficulty of Paying Living Expenses:    Food Insecurity:     Worried About Running Out of Food in the Last Year:     [address] in the Last Year:    Transportation Needs:     Lack of Transportation (Medical):  Lack of Transportation (Non-Medical):    Physical Activity:     Days of Exercise per Week:     Minutes of Exercise per Session:    Stress:     Feeling of Stress :    Social Connections:     Frequency of Communication with Friends and Family:     Frequency of Social Gatherings with Friends and Family:     Attends [employer] Services:     Active Member of Clubs or Organizations:     Attends Club or Organization Meetings:     Marital Status:    Intimate Partner Violence:     Fear of Current or Ex-Partner:     Emotionally Abused:     Physically Abused:     Sexually Abused:            ROS:  [x] All negative/unchanged except if checked.  Explain positive(checked items) below:  [] Constitutional  [] Eyes  [] Ear/Nose/Mouth/Throat  [] Respiratory  [] CV  [] GI  []   [] Musculoskeletal  [] Skin/Breast  [] Neurological  [] Endocrine  [] Heme/Lymph  [] Allergic/Immunologic    Explanation:     MEDICATIONS:    Current Facility-Administered Medications:     divalproex (DEPAKOTE) DR tablet 500 mg, 500 mg, Oral, 2 times per day, CELSO Spears - CNP, 062 mg at 10/23/21 0856    OLANZapine (ZYPREXA) tablet 10 mg, 10 mg, Oral, Nightly, Jeana Lundberg APRN - CNP    nicotine polacrilex (NICORETTE) gum 4 mg, 4 mg, Oral, Q2H PRN, Adela Morrell MD, 4 mg at 10/23/21 1130    ziprasidone (GEODON) injection 20 mg, 20 mg, IntraMUSCular, Once, Key Peters,     sterile water injection 1.2 mL, 1.2 mL, Injection, Once, Joshua Henderson DO Abhi    acetaminophen (TYLENOL) tablet 650 mg, 650 mg, Oral, Q6H PRN, Roselia Dominguez MD    magnesium hydroxide (MILK OF MAGNESIA) 400 MG/5ML suspension 30 mL, 30 mL, Oral, Daily PRN, Roselia Dominguez MD    aluminum & magnesium hydroxide-simethicone (MAALOX) 200-200-20 MG/5ML suspension 30 mL, 30 mL, Oral, PRN, Roselia Dominguez MD    hydrOXYzine (VISTARIL) capsule 50 mg, 50 mg, Oral, TID PRN, Roselia Dominguez MD, 50 mg at 10/23/21 0856    traZODone (DESYREL) tablet 50 mg, 50 mg, Oral, Nightly PRN, Roselia Dominguez MD, 50 mg at 10/22/21 2110    diphenhydrAMINE (BENADRYL) injection 50 mg, 50 mg, IntraMUSCular, Q6H PRN, Roselia Dominguez MD    diphenhydrAMINE (BENADRYL) tablet 50 mg, 50 mg, Oral, Q6H PRN, Roselia Dominguez MD    LORazepam (ATIVAN) tablet 2 mg, 2 mg, Oral, Q6H PRN, Roselia Dominguez MD    LORazepam (ATIVAN) injection 2 mg, 2 mg, IntraMUSCular, Q6H PRN, Roselia Dominguez MD    influenza quadrivalent split vaccine (FLUZONE;FLUARIX;FLULAVAL;AFLURIA) injection 0.5 mL, 0.5 mL, IntraMUSCular, Prior to discharge, Roselia Dominguez MD    fluPHENAZine HCl (PROLIXIN) injection 5 mg, 5 mg, IntraMUSCular, Q6H PRN, Roselia Dominguez MD      Examination:  BP (!) 111/58   Pulse 73   Temp 97 °F (36.1 °C)   Resp 16   Ht 5' 8\" (1.727 m)   Wt 228 lb (103.4 kg)   LMP  (LMP Unknown)   SpO2 99%   BMI 34.67 kg/m²   Gait - steady  Medication side effects(SE): none reported    Mental Status Examination:    Level of consciousness:  within normal limits   Appearance:  fair grooming and fair hygiene  Behavior/Motor:  hyperactive  Attitude toward examiner:  cooperative and playful  Speech:  hyperverbal and pressured   Mood: euphoric  Affect:  mood congruent and intense  Thought processes:  rapid, overabundance of ideas, flight of ideas, illogical and loose associations   Thought content: Somatic delusions  Cognition:  oriented to person, place, and time   Concentration distractible  Insight poor Judgement poor     ASSESSMENT:   Patient symptoms are:  [] Well controlled  [] Improving  [] Worsening  [x] No change      Diagnosis:   Principal Problem:    Severe manic bipolar 1 disorder with psychotic behavior ([facility] [address]  Active Problems:    Polysubstance abuse ([facility]  Resolved Problems:    * No resolved hospital problems. *      LABS:    Recent Labs     10/21/21  1114   WBC 13.5*   HGB 13.8        Recent Labs     10/21/21  1114      K 4.2      CO2 25   BUN 6   CREATININE 0.9   GLUCOSE 115*     Recent Labs     10/21/21  1114   BILITOT 0.3   ALKPHOS 76   AST 17   ALT 12     Lab Results   Component Value Date    LABAMPH NOT DETECTED 10/21/2021    BARBSCNU NOT DETECTED 10/21/2021    LABBENZ NOT DETECTED 10/21/2021    LABMETH NOT DETECTED 10/21/2021    OPIATESCREENURINE NOT DETECTED 10/21/2021    PHENCYCLIDINESCREENURINE NOT DETECTED 10/21/2021    ETOH <10 10/21/2021     No results found for: TSH, FREET4  No results found for: LITHIUM  No results found for: VALPROATE, CBMZ      Treatment Plan:  The patient's diagnosis, treatment plan, medication management were formulated after patient was seen directly by the attending physician and myself and all relevant documentation was reviewed. The patient was referred to outpatient/inpatient substance abuse rehabilitation programming. She was educated multiple times during the hospitalization that if she chooses to continue to use drugs or alcohol, she may act out impulsively, resulting in serious harm to self or others even though unintentional.  She was also educated that mental health treatment cannot be optimized with ongoing use of drugs or alcohol she demonstrated understanding and has the capacity to understand that. Reviewed current Medications with the patient. Risk, benefit, side effects, possible outcomes of the medication and alternatives discussed with the patient and the patient demonstrated understanding.   The patient was also educated that the outcome of treatment will depend on the medication compliance as directed by the prescribers along with regular follow-up, compliance with the labs and other work-up, as clinically indicated. Collateral information: Followed by social work  CD evaluation  Encourage patient to attend group and other milieu activities. Discharge planning discussed with the patient and treatment team.    PSYCHOTHERAPY/COUNSELING:  [x] Therapeutic interview  [x] Supportive  [] CBT  [] Ongoing  [] Other    [x] Patient continues to need, on a daily basis, active treatment furnished directly by or requiring the supervision of inpatient psychiatric personnel      Anticipated Length of stay: 5 - 7 days based on stability      NOTE: This report was transcribed using voice recognition software.  Every effort was made to ensure accuracy; however, inadvertent computerized transcription errors may be present.          Electronically signed by CELSO Marie CNP on 10/23/2021 at 4:36 PM

## 2021-10-23 NOTE — GROUP NOTE
Group Therapy Note    Date: 10/23/2021    Group Start Time: 1100  Group End Time: 1130  Group Topic: Cognitive Skills    [facility]    LAKESHA Mosquera, DARRYN        Group Therapy Note    Attendees: 13         Patient's Goal:  To participate in group discussion on positive psychology prompt cards. Notes:  Pt was an active participant. Status After Intervention:  Unchanged    Participation Level:  Active Listener, Interactive and Monopolizing    Participation Quality: Appropriate, Attentive, Sharing, Supportive and Intrusive      Speech:  pressured      Thought Process/Content: Linear      Affective Functioning: Congruent      Mood: anxious      Level of consciousness:  Alert and Oriented x4      Response to Learning: Able to verbalize current knowledge/experience      Endings: None Reported    Modes of Intervention: Education, Support, Socialization, Exploration, Clarifying and Problem-solving      Discipline Responsible: /Counselor      Signature:  LAKESHA Hill, DARRYN

## 2021-10-23 NOTE — PROGRESS NOTES
Patient is out on unit,social with select peers. Speech is rapid. Denies thoughts of harm to self or others. Verbalizes not hearing voices or seeing things not there. Verbalizes anxiety is 3 out of 10 due to trying to get out of here. And get home. Patient observed easily irritated, remains manic  Denies depressed. Verbalizes appetite is good. States no problem with her sleep. Compliant with medications. No groups noted attended. Will continue to monitor.

## 2021-10-23 NOTE — PROGRESS NOTES
Patient approached this nurse stating that she did not take the Zyprexa ,because she is allergic to Zyprexa. This nurse ask why it does npt show in her allergies. Patient stated that she was not in right state when she came in.

## 2021-10-24 PROCEDURE — 6370000000 HC RX 637 (ALT 250 FOR IP): Performed by: NURSE PRACTITIONER

## 2021-10-24 PROCEDURE — 1240000000 HC EMOTIONAL WELLNESS R&B

## 2021-10-24 PROCEDURE — 6370000000 HC RX 637 (ALT 250 FOR IP): Performed by: PSYCHIATRY & NEUROLOGY

## 2021-10-24 PROCEDURE — 99231 SBSQ HOSP IP/OBS SF/LOW 25: CPT | Performed by: NURSE PRACTITIONER

## 2021-10-24 RX ADMIN — HYDROXYZINE PAMOATE 50 MG: 50 CAPSULE ORAL at 10:01

## 2021-10-24 RX ADMIN — RISPERIDONE 1 MG: 0.5 TABLET, ORALLY DISINTEGRATING ORAL at 10:02

## 2021-10-24 RX ADMIN — RISPERIDONE 1 MG: 0.5 TABLET, ORALLY DISINTEGRATING ORAL at 20:12

## 2021-10-24 RX ADMIN — ACETAMINOPHEN 650 MG: 325 TABLET ORAL at 10:02

## 2021-10-24 RX ADMIN — HYDROXYZINE PAMOATE 50 MG: 50 CAPSULE ORAL at 20:12

## 2021-10-24 RX ADMIN — DIVALPROEX SODIUM 500 MG: 250 TABLET, DELAYED RELEASE ORAL at 10:01

## 2021-10-24 RX ADMIN — TRAZODONE HYDROCHLORIDE 50 MG: 50 TABLET ORAL at 20:12

## 2021-10-24 RX ADMIN — DIVALPROEX SODIUM 500 MG: 250 TABLET, DELAYED RELEASE ORAL at 20:12

## 2021-10-24 ASSESSMENT — PAIN SCALES - GENERAL
PAINLEVEL_OUTOF10: 0
PAINLEVEL_OUTOF10: 5
PAINLEVEL_OUTOF10: 0
PAINLEVEL_OUTOF10: 0

## 2021-10-24 NOTE — PLAN OF CARE
Patient was out on unit. Denies SI,HI or AV hallucinations. Patient continue to have rapid,pressured speech. Intrusive, easily agitated. Patient remains anxious,flight of ideas. Was observed on phone assumed with  at one point argumentive and the next sentence telling them how much she loved him. Patient verbalizes this hospital has been the most helpful . And that she has been able to socialize with people since admission. Stating also that her and  are fine now ,just that he is going crazy inhis head. Compliant with evening medications. One group noted attended. Will continue to monitor.

## 2021-10-24 NOTE — GROUP NOTE
Group Therapy Note    Date: 10/24/2021    Group Start Time: 1100  Group End Time: 1130  Group Topic: Psychoeducation    [facility]    LAKESHA Valentino LSW        Group Therapy Note    Attendees: 11         Patient's Goal:  Patient will be able to verbalize and understand worry coping cards/coping skills    Notes:  Patient participated in group and made connections    Status After Intervention:  Improved    Participation Level: Interactive    Participation Quality: Appropriate, Attentive and Sharing      Speech:  normal and hesitant      Thought Process/Content: Logical  Linear      Affective Functioning: Congruent      Mood: anxious and depressed      Level of consciousness:  Alert, Oriented x4 and Attentive      Response to Learning: Able to verbalize current knowledge/experience      Endings: None Reported    Modes of Intervention: Education, Support, Socialization, Exploration, Clarifying and Problem-solving      Discipline Responsible: /Counselor      Signature:  LAKESHA Valentino LSW

## 2021-10-24 NOTE — CARE COORDINATION
SW attempted to meet with patient to discuss treatment. Patient in bed sleeping and was unable to be roused despite several attempts. Patient resting with even respirations.

## 2021-10-24 NOTE — PROGRESS NOTES
[facility] NOTE     10/24/2021     Patient was seen and examined in person, Chart reviewed   Patient's case discussed with staff/team    Chief Complaint: Resting soundly in her room    Interim History:     Patient observed in her room she is sleeping soundly and in no distress. The patient has been extremely manic, however pleasant. Staff report she refused her Zyprexa when asked about this she states she is allergic to it. However is requesting something in it's place. She is agreeable to Risperdal. She is pleasant, exaggerated, manic and has some delusional thought processes. She is discharge focused, but states she knows she cannot go home today. She has very poor insight into need for treatment. She is minimizing the circumstances surrounding her hospitalization. She has FOI and is difficult to converse with. She is requesting her stuffed animal and voices understanding of why she cannot have it on the unit. Appetite:  [x] Normal/Unchanged  [] Increased  [] Decreased      Sleep:       [] Normal/Unchanged  [] Fair       [x] Poor              Energy:    [] Normal/Unchanged  [x] Increased  [] Decreased        SI [] Present  [x] Absent    HI  []Present  [x] Absent     Aggression:  [] yes  [x] no    Patient is [x] able  [] unable to CONTRACT FOR SAFETY     PAST MEDICAL/PSYCHIATRIC HISTORY:   Past Medical History:   Diagnosis Date    Migraine     Schizophrenia ([facility] [address]        FAMILY/SOCIAL HISTORY:  History reviewed. No pertinent family history.   Social History     Socioeconomic History    Marital status:      Spouse name: Brooke Baumann Number of children: 0    Years of education: 15    Highest education level: Not on file   Occupational History    Not on file   Tobacco Use    Smoking status: Current Every Day Smoker     Packs/day: 0.50    Smokeless tobacco: Never Used   Vaping Use    Vaping Use: Never used   Substance and Sexual Activity    Alcohol use: Not Currently     Comment: QUIT MONTHS AGO , 1/5 DAILY \"    Drug use: Yes     Types: Marijuana     Comment: reports cbd, \"QUIT COCAINE DAYS AGO\"     Sexual activity: Yes   Other Topics Concern    Not on file   Social History Narrative    Not on file     Social Determinants of Health     Financial Resource Strain:     Difficulty of Paying Living Expenses:    Food Insecurity:     Worried About Running Out of Food in the Last Year:     [address] in the Last Year:    Transportation Needs:     Lack of Transportation (Medical):  Lack of Transportation (Non-Medical):    Physical Activity:     Days of Exercise per Week:     Minutes of Exercise per Session:    Stress:     Feeling of Stress :    Social Connections:     Frequency of Communication with Friends and Family:     Frequency of Social Gatherings with Friends and Family:     Attends [employer] Services:     Active Member of Clubs or Organizations:     Attends Club or Organization Meetings:     Marital Status:    Intimate Partner Violence:     Fear of Current or Ex-Partner:     Emotionally Abused:     Physically Abused:     Sexually Abused:            ROS:  [x] All negative/unchanged except if checked.  Explain positive(checked items) below:  [] Constitutional  [] Eyes  [] Ear/Nose/Mouth/Throat  [] Respiratory  [] CV  [] GI  []   [] Musculoskeletal  [] Skin/Breast  [] Neurological  [] Endocrine  [] Heme/Lymph  [] Allergic/Immunologic    Explanation:     MEDICATIONS:    Current Facility-Administered Medications:     risperiDONE (RISPERDAL M-TABS) disintegrating tablet 1 mg, 1 mg, Oral, BID, Tawny Parisi APRN - CNP, 1 mg at 10/24/21 1002    divalproex (DEPAKOTE) DR tablet 500 mg, 500 mg, Oral, 2 times per day, Demetris Torres APRN - CNP, 872 mg at 10/24/21 1001    nicotine polacrilex (NICORETTE) gum 4 mg, 4 mg, Oral, Q2H PRN, Don Thurman MD, 4 mg at 10/23/21 2039    ziprasidone (GEODON) injection 20 mg, 20 mg, IntraMUSCular, Once, Chaparrita King DO   sterile water injection 1.2 mL, 1.2 mL, Injection, Once, [place] Liter, DO    acetaminophen (TYLENOL) tablet 650 mg, 650 mg, Oral, Q6H PRN, Kierra Collier MD, 650 mg at 10/24/21 1002    magnesium hydroxide (MILK OF MAGNESIA) 400 MG/5ML suspension 30 mL, 30 mL, Oral, Daily PRN, Kierra Collier MD    aluminum & magnesium hydroxide-simethicone (MAALOX) 200-200-20 MG/5ML suspension 30 mL, 30 mL, Oral, PRN, Kierra Collier MD    hydrOXYzine (VISTARIL) capsule 50 mg, 50 mg, Oral, TID PRN, Kierra Collier MD, 50 mg at 10/24/21 1001    traZODone (DESYREL) tablet 50 mg, 50 mg, Oral, Nightly PRN, Kierra Collier MD, 50 mg at 10/23/21 2039    diphenhydrAMINE (BENADRYL) injection 50 mg, 50 mg, IntraMUSCular, Q6H PRN, Kierra Collier MD    diphenhydrAMINE (BENADRYL) tablet 50 mg, 50 mg, Oral, Q6H PRN, Kierra Collier MD    LORazepam (ATIVAN) tablet 2 mg, 2 mg, Oral, Q6H PRN, Kierra Collier MD    LORazepam (ATIVAN) injection 2 mg, 2 mg, IntraMUSCular, Q6H PRN, Kierra Collier MD    influenza quadrivalent split vaccine (FLUZONE;FLUARIX;FLULAVAL;AFLURIA) injection 0.5 mL, 0.5 mL, IntraMUSCular, Prior to discharge, Kierra Collier MD    fluPHENAZine HCl (PROLIXIN) injection 5 mg, 5 mg, IntraMUSCular, Q6H PRN, Kierra Collier MD      Examination:  /69   Pulse 70   Temp 97.4 °F (36.3 °C)   Resp 15   Ht 5' 8\" (1.727 m)   Wt 228 lb (103.4 kg)   LMP  (LMP Unknown)   SpO2 99%   BMI 34.67 kg/m²   Gait - steady  Medication side effects(SE): none reported    Mental Status Examination:    Level of consciousness:  within normal limits   Appearance:  fair grooming and fair hygiene  Behavior/Motor:  hyperactive  Attitude toward examiner:  cooperative and playful  Speech:  hyperverbal and pressured   Mood: euphoric  Affect:  mood congruent and intense  Thought processes:  rapid, overabundance of ideas, flight of ideas, illogical and loose associations   Thought content: Somatic delusions  Cognition:  oriented to person, place, and time   Concentration distractible  Insight poor   Judgement poor     ASSESSMENT:   Patient symptoms are:  [] Well controlled  [] Improving  [] Worsening  [x] No change      Diagnosis:   Principal Problem:    Severe manic bipolar 1 disorder with psychotic behavior ([facility]  Active Problems:    Polysubstance abuse ([facility]  Resolved Problems:    * No resolved hospital problems. *      LABS:    No results for input(s): WBC, HGB, PLT in the last 72 hours. No results for input(s): NA, K, CL, CO2, BUN, CREATININE, GLUCOSE in the last 72 hours. No results for input(s): BILITOT, ALKPHOS, AST, ALT in the last 72 hours. Lab Results   Component Value Date    LABAMPH NOT DETECTED 10/21/2021    BARBSCNU NOT DETECTED 10/21/2021    LABBENZ NOT DETECTED 10/21/2021    LABMETH NOT DETECTED 10/21/2021    OPIATESCREENURINE NOT DETECTED 10/21/2021    PHENCYCLIDINESCREENURINE NOT DETECTED 10/21/2021    ETOH <10 10/21/2021     No results found for: TSH, FREET4  No results found for: LITHIUM  No results found for: VALPROATE, CBMZ      Treatment Plan:  The patient's diagnosis, treatment plan, medication management were formulated after patient was seen directly by the attending physician and myself and all relevant documentation was reviewed. The patient was referred to outpatient/inpatient substance abuse rehabilitation programming. She was educated multiple times during the hospitalization that if she chooses to continue to use drugs or alcohol, she may act out impulsively, resulting in serious harm to self or others even though unintentional.  She was also educated that mental health treatment cannot be optimized with ongoing use of drugs or alcohol she demonstrated understanding and has the capacity to understand that. Reviewed current Medications with the patient.   Risk, benefit, side effects, possible outcomes of the medication and alternatives discussed with the patient and the patient demonstrated understanding. The patient was also educated that the outcome of treatment will depend on the medication compliance as directed by the prescribers along with regular follow-up, compliance with the labs and other work-up, as clinically indicated. Collateral information: Followed by social work  CD evaluation  Encourage patient to attend group and other milieu activities. Discharge planning discussed with the patient and treatment team.    PSYCHOTHERAPY/COUNSELING:  [x] Therapeutic interview  [x] Supportive  [] CBT  [] Ongoing  [] Other    [x] Patient continues to need, on a daily basis, active treatment furnished directly by or requiring the supervision of inpatient psychiatric personnel      Anticipated Length of stay: 5 - 7 days based on stability      NOTE: This report was transcribed using voice recognition software.  Every effort was made to ensure accuracy; however, inadvertent computerized transcription errors may be present.          Electronically signed by CELSO Alonso CNP on 10/24/2021 at 2:08 PM

## 2021-10-25 PROCEDURE — 6370000000 HC RX 637 (ALT 250 FOR IP): Performed by: PSYCHIATRY & NEUROLOGY

## 2021-10-25 PROCEDURE — 99232 SBSQ HOSP IP/OBS MODERATE 35: CPT | Performed by: NURSE PRACTITIONER

## 2021-10-25 PROCEDURE — 6370000000 HC RX 637 (ALT 250 FOR IP): Performed by: NURSE PRACTITIONER

## 2021-10-25 PROCEDURE — 1240000000 HC EMOTIONAL WELLNESS R&B

## 2021-10-25 RX ADMIN — DIVALPROEX SODIUM 500 MG: 250 TABLET, DELAYED RELEASE ORAL at 09:02

## 2021-10-25 RX ADMIN — ACETAMINOPHEN 650 MG: 325 TABLET ORAL at 13:04

## 2021-10-25 RX ADMIN — HYDROXYZINE PAMOATE 50 MG: 50 CAPSULE ORAL at 09:47

## 2021-10-25 RX ADMIN — HYDROXYZINE PAMOATE 50 MG: 50 CAPSULE ORAL at 20:44

## 2021-10-25 RX ADMIN — TRAZODONE HYDROCHLORIDE 50 MG: 50 TABLET ORAL at 20:32

## 2021-10-25 RX ADMIN — RISPERIDONE 1 MG: 0.5 TABLET, ORALLY DISINTEGRATING ORAL at 20:32

## 2021-10-25 RX ADMIN — DIVALPROEX SODIUM 500 MG: 250 TABLET, DELAYED RELEASE ORAL at 20:32

## 2021-10-25 RX ADMIN — NICOTINE POLACRILEX 4 MG: 2 GUM, CHEWING BUCCAL at 18:35

## 2021-10-25 RX ADMIN — NICOTINE POLACRILEX 4 MG: 2 GUM, CHEWING BUCCAL at 09:47

## 2021-10-25 RX ADMIN — NICOTINE POLACRILEX 4 MG: 2 GUM, CHEWING BUCCAL at 13:45

## 2021-10-25 RX ADMIN — RISPERIDONE 1 MG: 0.5 TABLET, ORALLY DISINTEGRATING ORAL at 09:02

## 2021-10-25 ASSESSMENT — PAIN SCALES - GENERAL
PAINLEVEL_OUTOF10: 0
PAINLEVEL_OUTOF10: 5

## 2021-10-25 NOTE — GROUP NOTE
Group Therapy Note    Date: 10/25/2021    Group Start Time: 1115  Group End Time: 2265  Group Topic: Cognitive Skills    [facility]    Thankful LAKESHA Schafer, DARRYN        Group Therapy Note    Attendees: 13      Patient's Goal:  Patient will learn about the ACE study and the effects that adverse childhood experiences have on individuals. Notes:  Patient was an active participant in group therapy. Status After Intervention:  Improved    Participation Level: Active Listener and Interactive    Participation Quality: Appropriate, Attentive, Sharing and Supportive      Speech:  normal      Thought Process/Content: Logical      Affective Functioning: Congruent      Mood: depressed      Level of consciousness:  Alert, Oriented x4 and Attentive      Response to Learning: Able to verbalize current knowledge/experience, Able to verbalize/acknowledge new learning and Able to retain information      Endings: None Reported    Modes of Intervention: Education, Support, Socialization, Exploration, Clarifying and Problem-solving      Discipline Responsible: /Counselor      Signature:   LAKESHA Mcclain, DARRYN

## 2021-10-25 NOTE — BH NOTE
Out in day room with peers mood is bright denies any SI HI or garcia hopeful for D/C tor emotional support given

## 2021-10-25 NOTE — PROGRESS NOTES
Updated on evening staff changes and evening flow. Engaged in watching a movie. Pt was 1 out of 10 in attendance.

## 2021-10-25 NOTE — PROGRESS NOTES
[facility] FOLLOW-UP NOTE     10/25/2021     Patient personally seen and examined by me mental status examination performed. I agree the below assessment by the medical student. Psychomotor evaluation revealed no agitation retardation any abnormal movements. Her eye contact is fair her speech is normal rate rhythm and tone. Her mood is \"I am feeling a lot better. \"  Affect is mood congruent appropriate pleasant. Thought process is linear without flight of ideas loose associations. Thought contents devoid of any auditory visualizations delusions or other perceptual normalities. She denies SI/HI intent or plan. She denies suicidal homicidal ideations intent or plan her impulse control is adequate her cognitive function was to be at her baseline her insight judgment is fair she is alert oriented time place and person            Patient was seen and examined in person, Chart reviewed   Patient's case discussed with staff/team    Chief Complaint: \"I would like to leave here before Halloween. \"     Interim History:      Patient seen in treatment team.     The patient has been less manic than over the weekend and pleasant. She has been taking her medication. She is pleasant, exaggerated, manic and has some delusional thought processes. Per RN report, she was stating that water has been making her hallucinate. She is discharge focused because she wants to be home for Halloween, but states she knows she cannot go home today. She has very poor insight into need for treatment.         Appetite:  [x] Normal/Unchanged  [] Increased  [] Decreased      Sleep:       [x] Normal/Unchanged  [] Fair       [] Poor              Energy:    [] Normal/Unchanged  [x] Increased  [] Decreased        SI [] Present  [x] Absent     HI  []Present  [x] Absent     Aggression:  [] yes  [x] no    Patient is [x] able  [] unable to CONTRACT FOR SAFETY     PAST MEDICAL/PSYCHIATRIC HISTORY:   Past Medical History:   Diagnosis Date    Migraine     Schizophrenia ([facility])        FAMILY/SOCIAL HISTORY:  History reviewed. No pertinent family history. Social History     Socioeconomic History    Marital status:      Spouse name: Shireen Dakin Number of children: 0    Years of education: 15    Highest education level: Not on file   Occupational History    Not on file   Tobacco Use    Smoking status: Current Every Day Smoker     Packs/day: 0.50    Smokeless tobacco: Never Used   Vaping Use    Vaping Use: Never used   Substance and Sexual Activity    Alcohol use: Not Currently     Comment: QUIT MONTHS AGO , 1/5 DAILY \"    Drug use: Yes     Types: Marijuana     Comment: reports cbd, \"QUIT COCAINE DAYS AGO\"     Sexual activity: Yes   Other Topics Concern    Not on file   Social History Narrative    Not on file     Social Determinants of Health     Financial Resource Strain:     Difficulty of Paying Living Expenses:    Food Insecurity:     Worried About Running Out of Food in the Last Year:     Ran Out of Food in the Last Year:    Transportation Needs:     Lack of Transportation (Medical):  Lack of Transportation (Non-Medical):    Physical Activity:     Days of Exercise per Week:     Minutes of Exercise per Session:    Stress:     Feeling of Stress :    Social Connections:     Frequency of Communication with Friends and Family:     Frequency of Social Gatherings with Friends and Family:     Attends Gnosticist Services:     Active Member of Clubs or Organizations:     Attends Club or Organization Meetings:     Marital Status:    Intimate Partner Violence:     Fear of Current or Ex-Partner:     Emotionally Abused:     Physically Abused:     Sexually Abused:            ROS:  [x] All negative/unchanged except if checked.  Explain positive(checked items) below:  [] Constitutional  [] Eyes  [] Ear/Nose/Mouth/Throat  [] Respiratory  [] CV  [] GI  []   [] Musculoskeletal  [] Skin/Breast  [] Neurological  [] Endocrine  [] Heme/Lymph  [] Allergic/Immunologic    Explanation:     MEDICATIONS:    Current Facility-Administered Medications:     risperiDONE (RISPERDAL M-TABS) disintegrating tablet 1 mg, 1 mg, Oral, BID, Frankey Judy, APRN - CNP, 1 mg at 10/25/21 0902    divalproex (DEPAKOTE) DR tablet 500 mg, 500 mg, Oral, 2 times per day, Castro Vogel, APRN - CNP, 562 mg at 10/25/21 0902    nicotine polacrilex (NICORETTE) gum 4 mg, 4 mg, Oral, Q2H PRN, Nathalie Ryan MD, 4 mg at 10/25/21 0947    ziprasidone (GEODON) injection 20 mg, 20 mg, IntraMUSCular, Once, Jose Luis Choi DO    sterile water injection 1.2 mL, 1.2 mL, Injection, Once, Jose Luis Choi,     acetaminophen (TYLENOL) tablet 650 mg, 650 mg, Oral, Q6H PRN, Pavithra Serrato MD, 650 mg at 10/24/21 1002    magnesium hydroxide (MILK OF MAGNESIA) 400 MG/5ML suspension 30 mL, 30 mL, Oral, Daily PRN, Pavithra Serrato MD    aluminum & magnesium hydroxide-simethicone (MAALOX) 200-200-20 MG/5ML suspension 30 mL, 30 mL, Oral, PRN, Pavithra Serrato MD    hydrOXYzine (VISTARIL) capsule 50 mg, 50 mg, Oral, TID PRN, Pavithra Serrato MD, 50 mg at 10/25/21 0947    traZODone (DESYREL) tablet 50 mg, 50 mg, Oral, Nightly PRN, Pavithra Serrato MD, 50 mg at 10/24/21 2012    diphenhydrAMINE (BENADRYL) injection 50 mg, 50 mg, IntraMUSCular, Q6H PRN, Pavithra Serrato MD    diphenhydrAMINE (BENADRYL) tablet 50 mg, 50 mg, Oral, Q6H PRN, Pavithra Serrato MD    LORazepam (ATIVAN) tablet 2 mg, 2 mg, Oral, Q6H PRN, Pavithra Serrato MD    LORazepam (ATIVAN) injection 2 mg, 2 mg, IntraMUSCular, Q6H PRN, Pavithra Serrato MD    influenza quadrivalent split vaccine (FLUZONE;FLUARIX;FLULAVAL;AFLURIA) injection 0.5 mL, 0.5 mL, IntraMUSCular, Prior to discharge, Pavithra Serrato MD    fluPHENAZine HCl (PROLIXIN) injection 5 mg, 5 mg, IntraMUSCular, Q6H PRN, Pavithra Serrato MD      Examination:  /66   Pulse 72   Temp 99 °F (37.2 °C)   Resp 14   Ht 5' 8\" (1.727 m)   Wt 228 lb (103.4 kg)   LMP  (LMP Unknown)   SpO2 99%   BMI 34.67 kg/m²   Gait - steady  Medication side effects(SE): none reported    Mental Status Examination:    Level of consciousness:  within normal limits   Appearance:  fair grooming and fair hygiene  Behavior/Motor:  hyperactive  Attitude toward examiner:  cooperative and playful  Speech:  hyperverbal and pressured   Mood: euphoric  Affect:  mood congruent and intense  Thought processes:  rapid, overabundance of ideas, flight of ideas, illogical and loose associations   Thought content: Somatic delusions   Cognition:  oriented to person, place, and time   Concentration distractible  Insight poor   Judgement poor     ASSESSMENT:   Patient symptoms are:  [] Well controlled  [x] Improving  [] Worsening  [] No change      Diagnosis:   Principal Problem:    Severe manic bipolar 1 disorder with psychotic behavior ([facility]  Active Problems:    Polysubstance abuse ([facility]  Resolved Problems:    * No resolved hospital problems. *      LABS:    No results for input(s): WBC, HGB, PLT in the last 72 hours. No results for input(s): NA, K, CL, CO2, BUN, CREATININE, GLUCOSE in the last 72 hours. No results for input(s): BILITOT, ALKPHOS, AST, ALT in the last 72 hours. Lab Results   Component Value Date    LABAMPH NOT DETECTED 10/21/2021    BARBSCNU NOT DETECTED 10/21/2021    LABBENZ NOT DETECTED 10/21/2021    LABMETH NOT DETECTED 10/21/2021    OPIATESCREENURINE NOT DETECTED 10/21/2021    PHENCYCLIDINESCREENURINE NOT DETECTED 10/21/2021    ETOH <10 10/21/2021     No results found for: TSH, FREET4  No results found for: LITHIUM  No results found for: VALPROATE, CBMZ      Treatment Plan:  The patient's diagnosis, treatment plan, medication management were formulated after patient was seen directly by the attending physician and myself and all relevant documentation was reviewed. The patient was referred to outpatient/inpatient substance abuse rehabilitation programming.   She was educated multiple times during the hospitalization that if she chooses to continue to use drugs or alcohol, she may act out impulsively, resulting in serious harm to self or others even though unintentional.  She was also educated that mental health treatment cannot be optimized with ongoing use of drugs or alcohol she demonstrated understanding and has the capacity to understand that. Reviewed current Medications with the patient. Risk, benefit, side effects, possible outcomes of the medication and alternatives discussed with the patient and the patient demonstrated understanding. The patient was also educated that the outcome of treatment will depend on the medication compliance as directed by the prescribers along with regular follow-up, compliance with the labs and other work-up, as clinically indicated. Current medications:   Depakote 500 mg tablet BID for mood stabilization   Risperdal 1 mg tablet BID for mood stabilization   Vistaril 50 mg tablet PRN for anxiety       Collateral information: Followed by social work  CD evaluation  Encourage patient to attend group and other milieu activities. Discharge planning discussed with the patient and treatment team.    PSYCHOTHERAPY/COUNSELING:  [x] Therapeutic interview  [x] Supportive  [] CBT  [] Ongoing  [] Other    [x] Patient continues to need, on a daily basis, active treatment furnished directly by or requiring the supervision of inpatient psychiatric personnel      Anticipated Length of stay: 5 - 7 days based on stability      NOTE: This report was transcribed using voice recognition software.  Every effort was made to ensure accuracy; however, inadvertent computerized transcription errors may be present.          Electronically signed by Tressa Stockton on 10/25/2021 at 11:44 AM

## 2021-10-25 NOTE — PROGRESS NOTES
[facility]  Day 3 Interdisciplinary Treatment Plan NOTE    Review Date & Time: 10/25/2021 0958    Patient was in treatment team    Estimated Length of Stay Update:  3-5 days  Estimated Discharge Date Update: 5-8 days    EDUCATION:   Learner Progress Toward Treatment Goals: progressing    Method: follow care plan and attend groups    Outcome: meet goals and return home    PATIENT GOALS: \"go to group\"    PLAN/TREATMENT RECOMMENDATIONS UPDATE: continue present care plan working towards goals    GOALS UPDATE:   Time frame for Short-Term Goals: 3-5 days      Juan Luis Pastrana RN

## 2021-10-25 NOTE — PROGRESS NOTES
Patient was sleeping and easily awoken. But complained that she was not woken up for snack,but was given snack and patient then was compliant and more pleasant during assessment. Denies SI,HI or AV hallucinations. Observed anxious, but not  maniacor flight of ideas noted and more grounded. Patient states that she is able to talk remain calm and socialize with others. Compliant with medications and attends groups. Will continue to monitor.

## 2021-10-25 NOTE — GROUP NOTE
Group Therapy Note    Date: 10/25/2021    Group Start Time: 1000  Group End Time: 6121  Group Topic: Psychoeducation    [facility]    Agata Pruitt, CTRS        Group Therapy Note    Number of participants: 15  Type of group: Psychoeducation  Mode of intervention: Education, Support, Socialization, Exploration, Clarifying, and Problem-solving  Topic: Mental Health Maintenance Plan  Objective: Pt will develop and share 1 way to implement maintenance plan in recovery. Patient's Goal:  \"Attend groups\"     Notes:  Pt interactive during group developing and sharing 1 way to implement maintenance plan in recovery. Because tearful when talking about her friend but accepted support from peers. Status After Intervention:  Improved    Participation Level:  Active Listener and Interactive    Participation Quality: Appropriate, Attentive, Sharing and Supportive      Speech:  normal      Thought Process/Content: Logical      Affective Functioning: Congruent      Mood: euthymic      Level of consciousness:  Alert, Oriented x4 and Attentive      Response to Learning: Able to verbalize current knowledge/experience, Able to verbalize/acknowledge new learning, Able to retain information, Capable of insight, Able to change behavior and Progressing to goal      Endings: None Reported    Modes of Intervention: Education, Support, Socialization, Exploration, Clarifying and Problem-solving

## 2021-10-26 VITALS
HEIGHT: 68 IN | OXYGEN SATURATION: 99 % | TEMPERATURE: 96.5 F | WEIGHT: 228 LBS | RESPIRATION RATE: 14 BRPM | BODY MASS INDEX: 34.56 KG/M2 | HEART RATE: 83 BPM | SYSTOLIC BLOOD PRESSURE: 116 MMHG | DIASTOLIC BLOOD PRESSURE: 63 MMHG

## 2021-10-26 PROCEDURE — 6370000000 HC RX 637 (ALT 250 FOR IP): Performed by: NURSE PRACTITIONER

## 2021-10-26 PROCEDURE — 99239 HOSP IP/OBS DSCHRG MGMT >30: CPT | Performed by: NURSE PRACTITIONER

## 2021-10-26 PROCEDURE — 6370000000 HC RX 637 (ALT 250 FOR IP): Performed by: PSYCHIATRY & NEUROLOGY

## 2021-10-26 RX ORDER — DIVALPROEX SODIUM 500 MG/1
500 TABLET, DELAYED RELEASE ORAL EVERY 12 HOURS SCHEDULED
Qty: 60 TABLET | Refills: 0 | Status: SHIPPED | OUTPATIENT
Start: 2021-10-26 | End: 2021-11-25

## 2021-10-26 RX ORDER — RISPERIDONE 1 MG/1
1 TABLET, ORALLY DISINTEGRATING ORAL 2 TIMES DAILY
Qty: 60 TABLET | Refills: 0 | Status: SHIPPED | OUTPATIENT
Start: 2021-10-26 | End: 2021-11-25

## 2021-10-26 RX ADMIN — HYDROXYZINE PAMOATE 50 MG: 50 CAPSULE ORAL at 06:40

## 2021-10-26 RX ADMIN — RISPERIDONE 1 MG: 0.5 TABLET, ORALLY DISINTEGRATING ORAL at 09:51

## 2021-10-26 RX ADMIN — DIVALPROEX SODIUM 500 MG: 250 TABLET, DELAYED RELEASE ORAL at 09:51

## 2021-10-26 RX ADMIN — NICOTINE POLACRILEX 4 MG: 2 GUM, CHEWING BUCCAL at 06:40

## 2021-10-26 ASSESSMENT — PAIN SCALES - GENERAL: PAINLEVEL_OUTOF10: 0

## 2021-10-26 NOTE — SUICIDE SAFETY PLAN
SAFETY PLAN    A suicide Safety Plan is a document that supports someone when they are having thoughts of suicide. Warning Signs that indicate a suicidal crisis may be developing: What (situations, thoughts, feelings, body sensations, behaviors, etc.) do you experience that lets you know you are beginning to think about suicide? 1. none      Internal Coping Strategies:  What things can I do (relaxation techniques, hobbies, physical activities, etc.) to take my mind off my problems without contacting another person? 1. gardening  2. Walk dog  3. Sell jewelry    People and social settings that provide distraction: Who can I call or where can I go to distract me? 1. Name: Bertha Quevedo (mother) Phone: in phone  2. Name: Candis Mayo   Phone: in phone                   People whom I can ask for help: Who can I call when I need help - for example, friends, family, clergy, someone else? 1. Name: Rodney Parisi                Phone: in phone  2. Name:  sven  Phone: in phone         Professionals or [facility] I can contact during a crisis: Who can I call for help - for example, my doctor, my psychiatrist, my psychologist, a mental health provider, a suicide hotline? 1. Clinician Name:         Clinician Pager or Emergency Contact #:                 3. Suicide Prevention Lifeline: 3-964-838-TALK (4463)    4. [address] [place] [address] Emergency Services -  for example, [facility] suicide hotline, [facility] Hotline:       Emergency Services Address:       Emergency Services Phone: [phone]    Making the environment safe: How can I make my environment (house/apartment/living space) safer? For example, can I remove guns, medications, and other items?   1. nothing

## 2021-10-26 NOTE — PROGRESS NOTES
CLINICAL PHARMACY NOTE: MEDS TO BEDS    Total # of Prescriptions Filled: 2   The following medications were delivered to the patient:  · Divalproex dr 500mg  · Risperidone 1mg    Additional Documentation:  Delivered to Chao Velasquez RN @12pm 10/26

## 2021-10-26 NOTE — PROGRESS NOTES
[facility]  Discharge Note    Pt discharged with followings belongings:   Dentures: None  Vision - Corrective Lenses: None  Hearing Aid: None  Jewelry: None  Body Piercings Removed: N/A  Clothing: Socks, Pants, Shirt (Hoodie with strings, Jeans, socks, and underwear.)  Other Valuables: Wallet (wallet with misc paper. No ID or Cards.)   Valuables returned to patient. Patient education on aftercare instructions:  . Patient verbalize understanding of AVS:  .    Status EXAM upon discharge:  Status and Exam  Normal: No  Facial Expression: Exaggerated  Affect: Congruent  Level of Consciousness: Alert  Mood:Normal: No  Mood: Anxious, Labile, Irritable  Motor Activity:Normal: No  Motor Activity: Increased  Interview Behavior: Cooperative  Preception: [place] to Person, Paulette Daniel to Time, [place] to Place, [place] to Situation  Attention:Normal: No  Attention: Distractible  Thought Processes: Other(See comment)  Thought Content:Normal: No  Thought Content: Preoccupations  Hallucinations: None  Delusions: No  Delusions: Grandeur  Memory:Normal: No  Memory: Confabulation  Insight and Judgment: No  Insight and Judgment: Poor Judgment, Poor Insight  Present Suicidal Ideation: No  Present Homicidal Ideation: No      Metabolic Screening:    Lab Results   Component Value Date    LABA1C 5.4 10/22/2021       Lab Results   Component Value Date    CHOL 181 10/22/2021     Lab Results   Component Value Date    TRIG 141 10/22/2021     Lab Results   Component Value Date    HDL 37 10/22/2021     No components found for: [facility] T[facility]MENT [place]  Lab Results   Component Value Date    LABVLDL 28 10/22/2021       Davin Jesus RN

## 2021-10-26 NOTE — PROGRESS NOTES
Attended morning community meeting. Updated on staffing and daily expectations. Shared goal for the day as to contact my hubs and get my clothes.

## 2021-10-26 NOTE — DISCHARGE SUMMARY
DISCHARGE SUMMARY      Patient ID:  Poncho Castillo  [id number]  27 y.o.  1995    Admit date: 10/21/2021    Discharge date and time: 10/26/2021    Admitting Physician: Kierra Collier MD     Discharge Physician: Dr Kobe Silver MD    Discharge Diagnoses:   Patient Active Problem List   Diagnosis    Severe manic bipolar 1 disorder with psychotic behavior ([facility]    Polysubstance abuse ([facility]       Admission Condition: poor    Discharged Condition: stable    Admission Circumstance: Wandering naked on the impound lot and punching car windows      PAST MEDICAL/PSYCHIATRIC HISTORY:   Past Medical History:   Diagnosis Date    Migraine     Schizophrenia ([facility]        FAMILY/SOCIAL HISTORY:  History reviewed. No pertinent family history. Social History     Socioeconomic History    Marital status:      Spouse name: Thu William Number of children: 0    Years of education: 15    Highest education level: Not on file   Occupational History    Not on file   Tobacco Use    Smoking status: Current Every Day Smoker     Packs/day: 0.50    Smokeless tobacco: Never Used   Vaping Use    Vaping Use: Never used   Substance and Sexual Activity    Alcohol use: Not Currently     Comment: QUIT MONTHS AGO , 1/5 DAILY \"    Drug use: Yes     Types: Marijuana     Comment: reports cbd, \"QUIT COCAINE DAYS AGO\"     Sexual activity: Yes   Other Topics Concern    Not on file   Social History Narrative    Not on file     Social Determinants of Health     Financial Resource Strain:     Difficulty of Paying Living Expenses:    Food Insecurity:     Worried About Running Out of Food in the Last Year:     Ran Out of Food in the Last Year:    Transportation Needs:     Lack of Transportation (Medical):      Lack of Transportation (Non-Medical):    Physical Activity:     Days of Exercise per Week:     Minutes of Exercise per Session:    Stress:     Feeling of Stress :    Social Connections:     Frequency of Communication with Friends and Family:     Frequency of Social Gatherings with Friends and Family:     Attends [employer] Services:     Active Member of Clubs or Organizations:     Attends Club or Organization Meetings:     Marital Status:    Intimate Partner Violence:     Fear of Current or Ex-Partner:     Emotionally Abused:     Physically Abused:     Sexually Abused:        MEDICATIONS:    Current Facility-Administered Medications:     risperiDONE (RISPERDAL M-TABS) disintegrating tablet 1 mg, 1 mg, Oral, BID, CELSO Marie - CNP, 1 mg at 10/26/21 0951    divalproex (DEPAKOTE) DR tablet 500 mg, 500 mg, Oral, 2 times per day, CELSO Granados - CNP, 324 mg at 10/26/21 0951    nicotine polacrilex (NICORETTE) gum 4 mg, 4 mg, Oral, Q2H PRN, Gerson Combs MD, 4 mg at 10/26/21 0640    ziprasidone (GEODON) injection 20 mg, 20 mg, IntraMUSCular, Once, Nikki Confer, DO    sterile water injection 1.2 mL, 1.2 mL, Injection, Once, Nikki Confer, DO    acetaminophen (TYLENOL) tablet 650 mg, 650 mg, Oral, Q6H PRN, Jacqui Mukherjee MD, 650 mg at 10/25/21 1304    magnesium hydroxide (MILK OF MAGNESIA) 400 MG/5ML suspension 30 mL, 30 mL, Oral, Daily PRN, Jacqui Mukherjee MD    aluminum & magnesium hydroxide-simethicone (MAALOX) 200-200-20 MG/5ML suspension 30 mL, 30 mL, Oral, PRN, Jacqui Mukherjee MD    hydrOXYzine (VISTARIL) capsule 50 mg, 50 mg, Oral, TID PRN, Jacqui Mukherjee MD, 50 mg at 10/26/21 0640    traZODone (DESYREL) tablet 50 mg, 50 mg, Oral, Nightly PRN, Jacqui Mukherjee MD, 50 mg at 10/25/21 2032    diphenhydrAMINE (BENADRYL) injection 50 mg, 50 mg, IntraMUSCular, Q6H PRN, Jacqui Mukherjee MD    diphenhydrAMINE (BENADRYL) tablet 50 mg, 50 mg, Oral, Q6H PRN, Jacqui Mukherjee MD    LORazepam (ATIVAN) tablet 2 mg, 2 mg, Oral, Q6H PRN, Jacqui Mukherjee MD    LORazepam (ATIVAN) injection 2 mg, 2 mg, IntraMUSCular, Q6H PRN, Jacqui Mukherjee MD    fluPHENAZine HCl (PROLIXIN) injection 5 mg, 5 mg, IntraMUSCular, Q6H MEDARDONNancy MD    Examination:  /63   Pulse 83   Temp 96.5 °F (35.8 °C) (Oral)   Resp 14   Ht 5' 8\" (1.727 m)   Wt 228 lb (103.4 kg)   LMP  (LMP Unknown)   SpO2 99%   BMI 34.67 kg/m²   Gait - steady    HOSPITAL COURSE[de-identified]  Patient was admitted to the unit on 10/21/2021 was closely monitored for psychosis and manic symptoms. She was evaluated and treated with Risperdal 1 mg twice daily, Depakote 500 mg twice daily. Medical events were insignificant and patient continued to improve on the floor. She started coming out of her room she was attending groups to socializing with peers. She never made any suicidal statements or any suicidal gestures while in the unit. Social workers obtain confirmation patient's  who was able to voice any concerns that he had. He reported no safety concerns no access to any weapons. Treatment team felt the patient obtain the maximum benefit for her hospitalization She was set up with an outpatient mental health agency for outpatient follow-up services . At the time of discharge patient  did not show impulsive behavior. She was up on the unit she was attending groups and socializing with peers. She vehemently denied any suicidal homicidal ideations intent or plan. She was eating well and sleeping well there are no neurovegetative signs or symptoms of depression she denied any auditory visualizations. There are no overt or covert signs psychosis. She was appreciative of the help that she received here. This patient no longer meets criteria for inpatient hospitalization.           No AVH or paranoid thoughts  No Hopeless or worthless feeling  No active SI/HI  Appetite:  [x] Normal  [] Increased  [] Decreased    Sleep:       [x] Normal  [] Fair       [] Poor            Energy:    [x] Normal  [] Increased  [] Decreased     SI [] Present  [x] Absent  HI  []Present  [x] Absent   Aggression:  [] yes  [x] no  Patient is [x] able  [] unable to CONTRACT FOR SAFETY   Medication side effects(SE):  [x] None(Psych. Meds.) [] Other      Mental Status Examination on discharge:    Level of consciousness:  within normal limits   Appearance:  well-appearing  Behavior/Motor:  no abnormalities noted  Attitude toward examiner:  attentive and good eye contact  Speech:  spontaneous, normal rate and normal volume   Mood: \" I feels much better on the medications. \"  Affect: Appropriate and pleasant  Thought processes: Linear without flight of ideas loose associations  Thought content: Devoid of any auditory visualizations delusions or the perceptual arise. Denies SI/HI intent or plan  Cognition:  oriented to person, place, and time   Concentration intact  Memory intact  Insight good   Judgement fair   Fund of Knowledge adequate      ASSESSMENT:  Patient symptoms are:  [x] Well controlled  [x] Improving  [] Worsening  [] No change    Reason for more than one antipsychotic:  [x] N/A  [] 3 Failed Monotherapy attempts (Drugs tried:)  [] Crossover to a new antipsychotic  [] Taper to Monotherapy from Polypharmacy  [] Augmentation of clozapine therapy due to treatment resistance to single therapy    Diagnosis:  Principal Problem:    Severe manic bipolar 1 disorder with psychotic behavior ([facility]  Active Problems:    Polysubstance abuse ([facility]  Resolved Problems:    * No resolved hospital problems. *      LABS:    No results for input(s): WBC, HGB, PLT in the last 72 hours. No results for input(s): NA, K, CL, CO2, BUN, CREATININE, GLUCOSE in the last 72 hours. No results for input(s): BILITOT, ALKPHOS, AST, ALT in the last 72 hours.   Lab Results   Component Value Date    LABAMPH NOT DETECTED 10/21/2021    BARBSCNU NOT DETECTED 10/21/2021    LABBENZ NOT DETECTED 10/21/2021    LABMETH NOT DETECTED 10/21/2021    OPIATESCREENURINE NOT DETECTED 10/21/2021    PHENCYCLIDINESCREENURINE NOT DETECTED 10/21/2021    ETOH <10 10/21/2021     No results found for: TSH, FREET4  No results found for: LITHIUM  No results found for: VALPROATE, CBMZ    RISK ASSESSMENT AT DISCHARGE: Low risk for suicide and homicide. Treatment Plan:  Reviewed current Medications with the patient. Education provided on the complaince with treatment. Risks, benefits, side effects, drug-to-drug interactions and alternatives to treatment were discussed. Encourage patient to attend outpatient follow up appointment and therapy. Patient was advised to call the outpatient provider, visit the nearest ED or call 911 if symptoms are not manageable. Patient's family member was contacted prior to the discharge.          Medication List      START taking these medications    divalproex 500 MG DR tablet  Commonly known as: DEPAKOTE  Take 1 tablet by mouth every 12 hours     nicotine polacrilex 4 MG gum  Commonly known as: NICORETTE  Take 1 each by mouth every 2 hours as needed for Smoking cessation     risperiDONE 1 MG disintegrating tablet  Commonly known as: RISPERDAL M-TABS  Take 1 tablet by mouth 2 times daily           Where to Get Your Medications      These medications were sent to [facility] [address]    Phone: [phone]   · divalproex 500 MG DR tablet  · risperiDONE 1 MG disintegrating tablet     Information about where to get these medications is not yet available    Ask your nurse or doctor about these medications  · nicotine polacrilex 4 MG gum       Patient is counseled if she continues to abuse drugs or alcohol she could act out impulsively causing serious harm to herself or others even though it may be unintentional.  She demonstrated understanding of this and has the capacity understand this    Patient is counseled her mental health treatment will be difficult to optimize with ongoing use of drugs or alcohol she demonstrated understanding of this and has the capacity to understand this     Patient is counseled that if she uses any amount of opiates after any clean time she could have an unintentional overdose she demonstrated understanding standing of this and has  the capacity understand this    Patient is counseled she must remain compliant with all medications outpatient follow-up appointments    Patient is discharged home in stable condition      TIME SPEND - 35 MINUTES TO COMPLETE THE EVALUATION, DISCHARGE SUMMARY, MEDICATION RECONCILIATION AND FOLLOW UP CARE     Signed:  CELSO Mcguire - CHANTALE  85/79/3930  12:23 PM

## 2021-10-26 NOTE — PLAN OF CARE
Denies SI/HI  denies hallucinations  attending groups  cooperative with meds  irritable concerning discharge states she has a lot of serious things to do after discharge  will continue to monitor

## 2021-10-26 NOTE — CARE COORDINATION
In order to ensure appropriate transition and discharge planning is in place, the following documents have been transmitted to [facility], as the new outpatient provider:     The d/c diagnosis was transmitted to the next care provider   The reason for hospitalization was transmitted to the next care provider   The d/c medications (dosage and indication) were transmitted to the next care provider    The continuing care plan was transmitted to the next care provider

## 2021-10-26 NOTE — CARE COORDINATION
Spoke with pt  Marita Jewell about pt discharge. Marita Jewell expressed no concerns for discharge today. Marita Jewell only concern was with pt meds and follow up appointments, informed him that pt appointments are scheduled and that she will be discharge with 30 days worth of meds, no other questions or concerns.  He stated he will be at the hospital around 2 pm and is aware he needs to call the nurses station when he arrives,

## 2021-10-26 NOTE — GROUP NOTE
Group Therapy Note    Date: 10/26/2021    Group Start Time: 1000  Group End Time: 7008  Group Topic: Psychoeducation    [facility]    Agata Pruitt, CTRS        Group Therapy Note      Number of participants: 14  Type of group: Psychoeducation  Mode of intervention: Education, Support, Socialization, Exploration, Clarifying, and Problem-solving  Topic: Sustaining Motivation   Objective: Pt will identify 3 ways to sustain motivation in recovery. Notes:  Pt was interactive during group sharing 3 ways to maintain motivation in recovery. Pt attempting to staff split and challenge what staff was explaining. Frequent interrupting when staff was listening to peers share. Status After Intervention:  Unchanged    Participation Level:  Active Listener and Interactive    Participation Quality: Intrusive      Speech:  normal      Thought Process/Content: Perseverating      Affective Functioning: Congruent      Mood: euthymic      Level of consciousness:  Alert, Oriented x4 and Attentive      Response to Learning: Able to verbalize current knowledge/experience, Able to verbalize/acknowledge new learning, Able to retain information, Capable of insight, Able to change behavior and Progressing to goal      Endings: None Reported    Modes of Intervention: Education, Support, Socialization, Exploration, Clarifying and Problem-solving